# Patient Record
Sex: MALE | Race: WHITE | NOT HISPANIC OR LATINO
[De-identification: names, ages, dates, MRNs, and addresses within clinical notes are randomized per-mention and may not be internally consistent; named-entity substitution may affect disease eponyms.]

---

## 2019-03-15 ENCOUNTER — APPOINTMENT (OUTPATIENT)
Dept: DERMATOLOGY | Facility: CLINIC | Age: 64
End: 2019-03-15
Payer: COMMERCIAL

## 2019-03-15 PROCEDURE — 96910 PHOTCHMTX TAR&UVB/PTRLTM&UVB: CPT

## 2019-03-15 PROCEDURE — 99203 OFFICE O/P NEW LOW 30 MIN: CPT | Mod: 25

## 2019-03-18 ENCOUNTER — APPOINTMENT (OUTPATIENT)
Dept: DERMATOLOGY | Facility: CLINIC | Age: 64
End: 2019-03-18
Payer: COMMERCIAL

## 2019-03-18 PROCEDURE — 96910 PHOTCHMTX TAR&UVB/PTRLTM&UVB: CPT

## 2019-03-20 ENCOUNTER — APPOINTMENT (OUTPATIENT)
Dept: DERMATOLOGY | Facility: CLINIC | Age: 64
End: 2019-03-20
Payer: COMMERCIAL

## 2019-03-20 PROCEDURE — 96910 PHOTCHMTX TAR&UVB/PTRLTM&UVB: CPT

## 2019-03-22 ENCOUNTER — APPOINTMENT (OUTPATIENT)
Dept: DERMATOLOGY | Facility: CLINIC | Age: 64
End: 2019-03-22
Payer: COMMERCIAL

## 2019-03-22 PROCEDURE — 96910 PHOTCHMTX TAR&UVB/PTRLTM&UVB: CPT

## 2019-03-25 ENCOUNTER — APPOINTMENT (OUTPATIENT)
Dept: DERMATOLOGY | Facility: CLINIC | Age: 64
End: 2019-03-25
Payer: COMMERCIAL

## 2019-03-25 PROCEDURE — 96910 PHOTCHMTX TAR&UVB/PTRLTM&UVB: CPT

## 2019-03-27 ENCOUNTER — APPOINTMENT (OUTPATIENT)
Dept: DERMATOLOGY | Facility: CLINIC | Age: 64
End: 2019-03-27
Payer: COMMERCIAL

## 2019-03-27 PROCEDURE — 96910 PHOTCHMTX TAR&UVB/PTRLTM&UVB: CPT

## 2019-04-01 ENCOUNTER — APPOINTMENT (OUTPATIENT)
Dept: DERMATOLOGY | Facility: CLINIC | Age: 64
End: 2019-04-01
Payer: COMMERCIAL

## 2019-04-01 PROCEDURE — 96910 PHOTCHMTX TAR&UVB/PTRLTM&UVB: CPT

## 2019-04-03 ENCOUNTER — APPOINTMENT (OUTPATIENT)
Dept: DERMATOLOGY | Facility: CLINIC | Age: 64
End: 2019-04-03
Payer: COMMERCIAL

## 2019-04-03 PROCEDURE — 96910 PHOTCHMTX TAR&UVB/PTRLTM&UVB: CPT

## 2019-04-05 ENCOUNTER — APPOINTMENT (OUTPATIENT)
Dept: DERMATOLOGY | Facility: CLINIC | Age: 64
End: 2019-04-05
Payer: COMMERCIAL

## 2019-04-05 PROCEDURE — 96910 PHOTCHMTX TAR&UVB/PTRLTM&UVB: CPT

## 2019-04-08 ENCOUNTER — APPOINTMENT (OUTPATIENT)
Dept: DERMATOLOGY | Facility: CLINIC | Age: 64
End: 2019-04-08
Payer: COMMERCIAL

## 2019-04-08 PROCEDURE — 96910 PHOTCHMTX TAR&UVB/PTRLTM&UVB: CPT

## 2019-04-12 ENCOUNTER — APPOINTMENT (OUTPATIENT)
Dept: DERMATOLOGY | Facility: CLINIC | Age: 64
End: 2019-04-12
Payer: COMMERCIAL

## 2019-04-12 PROCEDURE — 96910 PHOTCHMTX TAR&UVB/PTRLTM&UVB: CPT

## 2019-04-15 ENCOUNTER — APPOINTMENT (OUTPATIENT)
Dept: DERMATOLOGY | Facility: CLINIC | Age: 64
End: 2019-04-15
Payer: COMMERCIAL

## 2019-04-15 PROCEDURE — 96910 PHOTCHMTX TAR&UVB/PTRLTM&UVB: CPT

## 2019-04-17 ENCOUNTER — APPOINTMENT (OUTPATIENT)
Dept: DERMATOLOGY | Facility: CLINIC | Age: 64
End: 2019-04-17
Payer: COMMERCIAL

## 2019-04-17 PROCEDURE — 99214 OFFICE O/P EST MOD 30 MIN: CPT

## 2019-04-19 ENCOUNTER — APPOINTMENT (OUTPATIENT)
Dept: DERMATOLOGY | Facility: CLINIC | Age: 64
End: 2019-04-19
Payer: COMMERCIAL

## 2019-04-19 PROCEDURE — 96910 PHOTCHMTX TAR&UVB/PTRLTM&UVB: CPT

## 2019-04-22 ENCOUNTER — APPOINTMENT (OUTPATIENT)
Dept: DERMATOLOGY | Facility: CLINIC | Age: 64
End: 2019-04-22

## 2019-10-25 ENCOUNTER — APPOINTMENT (OUTPATIENT)
Dept: FAMILY MEDICINE | Facility: CLINIC | Age: 64
End: 2019-10-25
Payer: COMMERCIAL

## 2019-10-25 VITALS
WEIGHT: 213 LBS | HEIGHT: 75 IN | BODY MASS INDEX: 26.49 KG/M2 | DIASTOLIC BLOOD PRESSURE: 90 MMHG | SYSTOLIC BLOOD PRESSURE: 146 MMHG | HEART RATE: 78 BPM

## 2019-10-25 DIAGNOSIS — Z00.00 ENCOUNTER FOR GENERAL ADULT MEDICAL EXAMINATION W/OUT ABNORMAL FINDINGS: ICD-10-CM

## 2019-10-25 DIAGNOSIS — L30.9 DERMATITIS, UNSPECIFIED: ICD-10-CM

## 2019-10-25 DIAGNOSIS — Z72.89 OTHER PROBLEMS RELATED TO LIFESTYLE: ICD-10-CM

## 2019-10-25 DIAGNOSIS — N40.0 BENIGN PROSTATIC HYPERPLASIA WITHOUT LOWER URINARY TRACT SYMPMS: ICD-10-CM

## 2019-10-25 PROCEDURE — 99203 OFFICE O/P NEW LOW 30 MIN: CPT

## 2019-10-25 RX ORDER — MULTIVITAMIN
TABLET ORAL
Refills: 0 | Status: ACTIVE | COMMUNITY

## 2019-10-25 RX ORDER — CALCIUM CARBONATE/VITAMIN D3 600 MG-10
TABLET ORAL
Refills: 0 | Status: ACTIVE | COMMUNITY

## 2019-10-27 PROBLEM — N40.0 BPH (BENIGN PROSTATIC HYPERPLASIA): Status: ACTIVE | Noted: 2019-10-27

## 2019-10-27 PROBLEM — L30.9 ECZEMA: Status: ACTIVE | Noted: 2019-10-27

## 2019-10-27 NOTE — REVIEW OF SYSTEMS
[Itching] : itching [Fever] : no fever [Chills] : no chills [Fatigue] : no fatigue [Chest Pain] : no chest pain [Palpitations] : no palpitations [Lower Ext Edema] : no lower extremity edema [Shortness Of Breath] : no shortness of breath [Wheezing] : no wheezing [Cough] : no cough [Joint Pain] : no joint pain [Back Pain] : no back pain [Mole Changes] : no mole changes [Nail Changes] : no nail changes [Headache] : no headache [Dizziness] : no dizziness [Fainting] : no fainting

## 2019-10-27 NOTE — PLAN
[FreeTextEntry1] : FLU VACCINE DECLINED TODAY\par AVOIDANCE OF HOT SHOWERS\par USE OF MOISTURIZER BID\par HAS TRIAMCINOLONE AT HOME\par ADVISED WITH WORSENING MAY NEED LIGHT THERAPY AGAIN WITH DERMATOLOGY\par NAMES OF LOCAL VASCULAR PROVIDED\par USE OF COMPRESSION STOCKINGS\par WILL MONITOR BLOOD PRESSURE - FOLLOW-UP FOR BP CHECK\par HAD EKG, LAB WORK, AUDIOLOGY EVALUATION THROUGH WORK RECENTLY - NEED RESULTS\par COLONOSCOPY REFUSED\par CALL WITH ANY QUESTIONS, CONCERNS OR CHANGES \par \par

## 2019-10-27 NOTE — PHYSICAL EXAM
[No Acute Distress] : no acute distress [Well Nourished] : well nourished [Well-Appearing] : well-appearing [No Respiratory Distress] : no respiratory distress  [No Accessory Muscle Use] : no accessory muscle use [Clear to Auscultation] : lungs were clear to auscultation bilaterally [Normal Rate] : normal rate  [Regular Rhythm] : with a regular rhythm [Normal S1, S2] : normal S1 and S2 [No Joint Swelling] : no joint swelling [Grossly Normal Strength/Tone] : grossly normal strength/tone [No Rash] : no rash [Coordination Grossly Intact] : coordination grossly intact [No Focal Deficits] : no focal deficits [Normal Gait] : normal gait [de-identified] : SIGNIFICANT BILATERAL VARICOSE VEINS MORE ON POSTERIOR THIGH [de-identified] : XEROSIS SKIN ON LEGS

## 2019-10-27 NOTE — HISTORY OF PRESENT ILLNESS
[FreeTextEntry1] : ECZEMA, VARICOSE VEINS [de-identified] : MR. ESCUDERO IS A VERY PLEASANT 63 YO PRESENTING TO [facility].  STATES THAT HE WAS DIAGNOSED WITH ECZEMA THE SAME TIME LAST YEAR.  DID LIGHT THERAPY WITH DERMATOLOGY DR. BAUM.  GIVEN TRIAMCINOLONE.  IMPROVED.  NOW AGAIN, HIS LEGS ARE "A LITTLE ITCHY".  DRY SKIN.  ALSO HISTORY OF VARICOSE VEINS BILATERALLY.  PRIOR REMOVAL ON RIGHT IN 2014.  HAS HAD NO EXTREMITY SWELLING.  LEGS NOT TIRED WITH WALKING BUT ARE UNCOMFORTABLE AT THE END OF THE DAY.  HAS COMPRESSION STOCKINGS AT HOME BUT IS NOT WEARING THEM.  DISCOMFORT MORE ON HIS THIGHS.  IT IS NOT AS UNCOMFORTABLE AS PRIOR TO LAST PROCEDURE.  VASCULAR: DR. CARRILLO.  STATES THAT HIS BLOOD PRESSURE WAS GOOD AT WORK.  NO HISTORY OF HYPERTENSION AND NEVER ON MEDICATION.  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   HAS HAD NO HEADACHE, DIZZINESS, CHEST PAIN, SHORTNESS OF BREATH, GI OR URINARY COMPLAINTS.  NO OTHER COMPLAINTS TODAY. \par \par UROLOGY: DR. FAROOQ\par OPHTHALMOLOGY: DR. YANG\par ENT: DR. JENSEN\par DERMATOLOGY: DR. BAUM

## 2019-11-18 ENCOUNTER — APPOINTMENT (OUTPATIENT)
Age: 64
End: 2019-11-18
Payer: COMMERCIAL

## 2019-11-18 ENCOUNTER — APPOINTMENT (OUTPATIENT)
Dept: VASCULAR SURGERY | Facility: CLINIC | Age: 64
End: 2019-11-18
Payer: COMMERCIAL

## 2019-11-18 VITALS
BODY MASS INDEX: 26.11 KG/M2 | SYSTOLIC BLOOD PRESSURE: 132 MMHG | OXYGEN SATURATION: 98 % | DIASTOLIC BLOOD PRESSURE: 93 MMHG | HEIGHT: 75 IN | HEART RATE: 64 BPM | TEMPERATURE: 98 F | WEIGHT: 210 LBS

## 2019-11-18 DIAGNOSIS — Z82.49 FAMILY HISTORY OF ISCHEMIC HEART DISEASE AND OTHER DISEASES OF THE CIRCULATORY SYSTEM: ICD-10-CM

## 2019-11-18 DIAGNOSIS — Z86.79 PERSONAL HISTORY OF OTHER DISEASES OF THE CIRCULATORY SYSTEM: ICD-10-CM

## 2019-11-18 DIAGNOSIS — I87.2 VENOUS INSUFFICIENCY (CHRONIC) (PERIPHERAL): ICD-10-CM

## 2019-11-18 PROCEDURE — 99203 OFFICE O/P NEW LOW 30 MIN: CPT

## 2019-11-18 PROCEDURE — 93970 EXTREMITY STUDY: CPT

## 2020-01-13 ENCOUNTER — NON-APPOINTMENT (OUTPATIENT)
Age: 65
End: 2020-01-13

## 2020-01-13 ENCOUNTER — APPOINTMENT (OUTPATIENT)
Dept: FAMILY MEDICINE | Facility: CLINIC | Age: 65
End: 2020-01-13
Payer: COMMERCIAL

## 2020-01-13 VITALS
DIASTOLIC BLOOD PRESSURE: 80 MMHG | HEIGHT: 75 IN | SYSTOLIC BLOOD PRESSURE: 120 MMHG | HEART RATE: 80 BPM | WEIGHT: 213 LBS | BODY MASS INDEX: 26.49 KG/M2

## 2020-01-13 DIAGNOSIS — I83.90 ASYMPTOMATIC VARICOSE VEINS OF UNSPECIFIED LOWER EXTREMITY: ICD-10-CM

## 2020-01-13 DIAGNOSIS — I87.2 VENOUS INSUFFICIENCY (CHRONIC) (PERIPHERAL): ICD-10-CM

## 2020-01-13 PROCEDURE — 99215 OFFICE O/P EST HI 40 MIN: CPT | Mod: 25

## 2020-01-13 PROCEDURE — 36415 COLL VENOUS BLD VENIPUNCTURE: CPT

## 2020-01-13 PROCEDURE — 93000 ELECTROCARDIOGRAM COMPLETE: CPT

## 2020-01-13 NOTE — PHYSICAL EXAM
[No Acute Distress] : no acute distress [Well Nourished] : well nourished [Well Developed] : well developed [Well-Appearing] : well-appearing [Normal Oropharynx] : the oropharynx was normal [No Lymphadenopathy] : no lymphadenopathy [Supple] : supple [No Accessory Muscle Use] : no accessory muscle use [Clear to Auscultation] : lungs were clear to auscultation bilaterally [No Respiratory Distress] : no respiratory distress  [Normal Rate] : normal rate  [Regular Rhythm] : with a regular rhythm [Normal S1, S2] : normal S1 and S2 [No Murmur] : no murmur heard [Soft] : abdomen soft [No Edema] : there was no peripheral edema [Pedal Pulses Present] : the pedal pulses are present [Non Tender] : non-tender [Non-distended] : non-distended [Normal Bowel Sounds] : normal bowel sounds [No Joint Swelling] : no joint swelling [Grossly Normal Strength/Tone] : grossly normal strength/tone [Deep Tendon Reflexes (DTR)] : deep tendon reflexes were 2+ and symmetric [Normal Affect] : the affect was normal [Alert and Oriented x3] : oriented to person, place, and time [Normal Insight/Judgement] : insight and judgment were intact [Normal Sclera/Conjunctiva] : normal sclera/conjunctiva [EOMI] : extraocular movements intact [PERRL] : pupils equal round and reactive to light [Normal Outer Ear/Nose] : the outer ears and nose were normal in appearance [No Rash] : no rash [Coordination Grossly Intact] : coordination grossly intact [No Focal Deficits] : no focal deficits [Normal Gait] : normal gait [de-identified] : CERUMEN BILATERALLY [de-identified] : VARICOSE VEINS MORE SIGNIFICANT POSTERIOR LEGS

## 2020-01-13 NOTE — ADDENDUM
[FreeTextEntry1] : I, Gisele Villasenor, acted solely as a scribe for Dr. Molina on this date [1/13/2020].

## 2020-01-13 NOTE — END OF VISIT
[FreeTextEntry3] : All medical record entries made by the Scribe were at my, Dr. Molina's, direction and personally dictated by me on [1/13/2020]. I have reviewed the chart and agree that the record accurately reflects my personal performance of the history, physical exam, assessment and plan. I have also personally directed, reviewed and agreed with the chart.

## 2020-01-13 NOTE — PLAN
[FreeTextEntry1] : OPTIMIZED MEDICALLY FOR PROPOSED SURGERY PROCEDURE PENDING PRE-OPERATIVE TESTING REVIEW\par NEED VASCULAR CONSULTANT NOTES FROM DR. CARRILLO\par EKG: NSR AT 60 BPM, IRBBB\par MR. ESCUDERO TO OBTAIN PRIOR EKGS FOR COMPARISON\par GOOD EXERCISE CAPACITY\par GI/DVT PROPHYLAXIS PER SURGERY\par AVOIDANCE OF NSAIDS, VITAMINS AND ASPIRIN CONTAINING PRODUCTS PRIOR TO SURGERY\par CALL WITH ANY QUESTIONS, CONCERNS OR CHANGES PRIOR TO PROCEDURE\par SUPPORT PROVIDED\par FOLLOW-UP POST-OPERATIVELY\par VACCINATIONS DECLINED\par

## 2020-01-13 NOTE — HISTORY OF PRESENT ILLNESS
[No Pertinent Cardiac History] : no history of aortic stenosis, atrial fibrillation, coronary artery disease, recent myocardial infarction, or implantable device/pacemaker [No Adverse Anesthesia Reaction] : no adverse anesthesia reaction in self or family member [No Pertinent Pulmonary History] : no history of asthma, COPD, sleep apnea, or smoking [(Patient denies any chest pain, claudication, dyspnea on exertion, orthopnea, palpitations or syncope)] : Patient denies any chest pain, claudication, dyspnea on exertion, orthopnea, palpitations or syncope [Chronic Anticoagulation] : no chronic anticoagulation [Chronic Kidney Disease] : no chronic kidney disease [Diabetes] : no diabetes [FreeTextEntry1] : RADIO FREQUENCY ABLATION OF THE LLE GSV FOLLOWED BY STAB PHLEBECTOMY OF THE RLE [FreeTextEntry2] : 1/24/20, 1/28/20 [FreeTextEntry3] : DR. CARRILLO [FreeTextEntry4] : MR. ESCUDERO IS A PLEASANT 65 YEAR OLD PATIENT WHO PRESENTS FOR PREOPERATIVE CLEARANCE FOR UPCOMING ABOVE VASCULAR PROCEDURES.  COMPLAINS OF DISCOMFORT BEHIND RIGHT KNEE RELATED TO VEINS. IS FEELING WELL TODAY. NO HISTORY OF MI, STROKE OR SEIZURE. DENIES PERSONAL OR FAMILY HISTORY OF BLOOD OR CLOTTING DISORDERS. DENIES EASY BLEEDING OR BRUISING. IS ABLE TO WALK MULTIPLE BLOCKS AND GO UP MULTIPLE FLIGHTS OF STAIRS WITHOUT DIFFICULTY. HAS HAD NO HEADACHE, DIZZINESS, CHEST PAIN, SHORTNESS OF BREATH, GI OR URINARY COMPLAINTS. NO OTHER COMPLAINTS.

## 2020-01-14 LAB
ALBUMIN SERPL ELPH-MCNC: 4.6 G/DL
ALP BLD-CCNC: 72 U/L
ALT SERPL-CCNC: 32 U/L
ANION GAP SERPL CALC-SCNC: 11 MMOL/L
APPEARANCE: CLEAR
APTT BLD: 31.4 SEC
AST SERPL-CCNC: 25 U/L
BASOPHILS # BLD AUTO: 0.08 K/UL
BASOPHILS NFR BLD AUTO: 1.2 %
BILIRUB SERPL-MCNC: 1.2 MG/DL
BILIRUBIN URINE: NEGATIVE
BLOOD URINE: NEGATIVE
BUN SERPL-MCNC: 25 MG/DL
CALCIUM SERPL-MCNC: 9.7 MG/DL
CHLORIDE SERPL-SCNC: 101 MMOL/L
CO2 SERPL-SCNC: 27 MMOL/L
COLOR: YELLOW
CREAT SERPL-MCNC: 0.96 MG/DL
EOSINOPHIL # BLD AUTO: 0.25 K/UL
EOSINOPHIL NFR BLD AUTO: 3.8 %
GLUCOSE QUALITATIVE U: NEGATIVE
GLUCOSE SERPL-MCNC: 100 MG/DL
HCT VFR BLD CALC: 52 %
HGB BLD-MCNC: 16.7 G/DL
IMM GRANULOCYTES NFR BLD AUTO: 0.6 %
INR PPP: 0.91 RATIO
KETONES URINE: NEGATIVE
LEUKOCYTE ESTERASE URINE: NEGATIVE
LYMPHOCYTES # BLD AUTO: 1.25 K/UL
LYMPHOCYTES NFR BLD AUTO: 19 %
MAN DIFF?: NORMAL
MCHC RBC-ENTMCNC: 29.1 PG
MCHC RBC-ENTMCNC: 32.1 GM/DL
MCV RBC AUTO: 90.8 FL
MONOCYTES # BLD AUTO: 0.54 K/UL
MONOCYTES NFR BLD AUTO: 8.2 %
NEUTROPHILS # BLD AUTO: 4.43 K/UL
NEUTROPHILS NFR BLD AUTO: 67.2 %
NITRITE URINE: NEGATIVE
PH URINE: 6
PLATELET # BLD AUTO: 298 K/UL
POTASSIUM SERPL-SCNC: 5.2 MMOL/L
PROT SERPL-MCNC: 7.4 G/DL
PROTEIN URINE: NEGATIVE
PT BLD: 10.3 SEC
RBC # BLD: 5.73 M/UL
RBC # FLD: 12.4 %
SODIUM SERPL-SCNC: 139 MMOL/L
SPECIFIC GRAVITY URINE: 1.03
UROBILINOGEN URINE: NORMAL
WBC # FLD AUTO: 6.59 K/UL

## 2020-01-20 ENCOUNTER — APPOINTMENT (OUTPATIENT)
Dept: CARDIOLOGY | Facility: CLINIC | Age: 65
End: 2020-01-20
Payer: COMMERCIAL

## 2020-01-20 ENCOUNTER — NON-APPOINTMENT (OUTPATIENT)
Age: 65
End: 2020-01-20

## 2020-01-20 VITALS
DIASTOLIC BLOOD PRESSURE: 84 MMHG | HEIGHT: 75 IN | BODY MASS INDEX: 26.73 KG/M2 | RESPIRATION RATE: 14 BRPM | HEART RATE: 59 BPM | SYSTOLIC BLOOD PRESSURE: 130 MMHG | WEIGHT: 215 LBS

## 2020-01-20 DIAGNOSIS — R94.31 ABNORMAL ELECTROCARDIOGRAM [ECG] [EKG]: ICD-10-CM

## 2020-01-20 DIAGNOSIS — R03.0 ELEVATED BLOOD-PRESSURE READING, W/OUT DIAGNOSIS OF HYPERTENSION: ICD-10-CM

## 2020-01-20 DIAGNOSIS — Z01.810 ENCOUNTER FOR PREPROCEDURAL CARDIOVASCULAR EXAMINATION: ICD-10-CM

## 2020-01-20 PROCEDURE — 93000 ELECTROCARDIOGRAM COMPLETE: CPT

## 2020-01-20 PROCEDURE — 99244 OFF/OP CNSLTJ NEW/EST MOD 40: CPT

## 2020-01-20 RX ORDER — TRIAMCINOLONE ACETONIDE 1 MG/G
0.1 OINTMENT TOPICAL
Qty: 454 | Refills: 0 | Status: DISCONTINUED | COMMUNITY
Start: 2019-09-11 | End: 2020-01-20

## 2020-01-20 NOTE — PHYSICAL EXAM
[General Appearance - Well Nourished] : well nourished [General Appearance - Well Developed] : well developed [Normal Conjunctiva] : the conjunctiva exhibited no abnormalities [Normal Oropharynx] : normal oropharynx [Normal Jugular Venous V Waves Present] : normal jugular venous V waves present [Heart Rate And Rhythm] : heart rate and rhythm were normal [Heart Sounds] : normal S1 and S2 [Murmurs] : no murmurs present [] : no respiratory distress [Respiration, Rhythm And Depth] : normal respiratory rhythm and effort [Auscultation Breath Sounds / Voice Sounds] : lungs were clear to auscultation bilaterally [Bowel Sounds] : normal bowel sounds [Abdomen Soft] : soft [Abdomen Tenderness] : non-tender [Nail Clubbing] : no clubbing of the fingernails [Abnormal Walk] : normal gait [Cyanosis, Localized] : no localized cyanosis [Skin Color & Pigmentation] : normal skin color and pigmentation [Skin Turgor] : normal skin turgor [Oriented To Time, Place, And Person] : oriented to person, place, and time [Affect] : the affect was normal [FreeTextEntry1] : varicosities bilaterally

## 2020-01-20 NOTE — HISTORY OF PRESENT ILLNESS
[FreeTextEntry1] : Patient is a 66yo M here for cardiac evaluation prior to undergoing RFA of LLE GSV and phlebectomy. Works as  for [employer].  with college aged daughter. No regular exercise, particularly in the winter. Does a lot os sailing and racing in spring and summer. Patient has been doing well without any chest pain or shortness of breath. Patient denies PND/orthopnea/edema/palpitations/syncope/claudication \par \par ROS: GI negative, all others negative

## 2020-01-20 NOTE — DISCUSSION/SUMMARY
[FreeTextEntry1] : Patient is a 64yo M here for cardiac evaluation prior to undergoing RFA of LLE GSV and phlebectomy. Currently no active cardiac conditions, able to achieve more than 4 METS activity. Low risk for low risk procedure. Does have mildly elevated BP in the office, also elevated lipids. Due to risk factors and mildly abnormal ECG I do recommend echo and EST after his procedure for further evaluation and risk stratification. Will discuss statin further at follow up, patient reluctant to take at this time. \par \par 1. Patient is at low cardiovascular risk for low risk surgery \par 2. Due to ECG, elevated BP and HLD, will arrange EST/echo after his procedure and recheck lipdis. Will discuss statin therapy further at follow up as well. \par 3. Follow up after testing

## 2020-02-27 ENCOUNTER — APPOINTMENT (OUTPATIENT)
Dept: FAMILY MEDICINE | Facility: CLINIC | Age: 65
End: 2020-02-27

## 2020-03-09 ENCOUNTER — APPOINTMENT (OUTPATIENT)
Dept: CARDIOLOGY | Facility: CLINIC | Age: 65
End: 2020-03-09
Payer: COMMERCIAL

## 2020-03-09 PROCEDURE — 93306 TTE W/DOPPLER COMPLETE: CPT

## 2020-03-09 PROCEDURE — 93015 CV STRESS TEST SUPVJ I&R: CPT

## 2020-03-11 DIAGNOSIS — I49.3 VENTRICULAR PREMATURE DEPOLARIZATION: ICD-10-CM

## 2020-03-11 DIAGNOSIS — R94.31 ABNORMAL ELECTROCARDIOGRAM [ECG] [EKG]: ICD-10-CM

## 2020-03-11 DIAGNOSIS — I47.2 VENTRICULAR TACHYCARDIA: ICD-10-CM

## 2020-03-18 ENCOUNTER — APPOINTMENT (OUTPATIENT)
Dept: CARDIOLOGY | Facility: CLINIC | Age: 65
End: 2020-03-18

## 2020-04-14 ENCOUNTER — APPOINTMENT (OUTPATIENT)
Dept: CARDIOLOGY | Facility: CLINIC | Age: 65
End: 2020-04-14

## 2020-04-16 ENCOUNTER — APPOINTMENT (OUTPATIENT)
Dept: CARDIOLOGY | Facility: CLINIC | Age: 65
End: 2020-04-16

## 2022-06-20 ENCOUNTER — APPOINTMENT (OUTPATIENT)
Dept: ORTHOPEDIC SURGERY | Facility: CLINIC | Age: 67
End: 2022-06-20
Payer: COMMERCIAL

## 2022-06-20 VITALS — BODY MASS INDEX: 24.37 KG/M2 | HEIGHT: 75 IN | WEIGHT: 196 LBS

## 2022-06-20 PROCEDURE — 72100 X-RAY EXAM L-S SPINE 2/3 VWS: CPT

## 2022-06-20 PROCEDURE — 99204 OFFICE O/P NEW MOD 45 MIN: CPT

## 2022-06-21 NOTE — REVIEW OF SYSTEMS
[Joint Pain] : joint pain [Joint Stiffness] : joint stiffness [FreeTextEntry9] : l-spine, b/l groin, b/l legs/calf

## 2022-06-21 NOTE — PHYSICAL EXAM
[NL (90)] : forward flexion 90 degrees [NL (30)] : right lateral rotation 30 degrees [NL (45)] : extension 45 degrees [NL (40)] : right lateral bending 40 degrees [5___] : right extensor hallicus longus 5[unfilled]/5 [Outside films reviewed] : Outside films reviewed [de-identified] : Constitutional:\par -  General Appearance: \par Unremarkable\par Body Habitus\par Well Developed \par Well Nourished\par Body Habitus\par No Deformities\par Well Groomed\par \par Ability To communicate:\par Normal\par \par Neurologic: \par Global sensation is intact to upper and lower extremities.  See examination of Neck and/or Spine for exceptions.\par Orientation to Time, Place and Person is: Normal\par Mood And Affect is Normal\par \par Skin:\par -  Head/Face, Right Upper/Lower Extremity, Left Upper/Lower Extremity: Normal\par See Examination of Neck and/or Spine for exceptions\par \par Cardiovascular:\par Peripheral Cardiovascular System is Normal\par \par Palpation of Lymph Nodes:\par Normal Palpation of lymph nodes in: Axilla, Cervical, Inguinal\par Abnormal Palpation of lymph nodes in: None  [] : non-antalgic [FreeTextEntry1] : Lumbar DDD\par Disc narrowing throughout the entire lumbar spine\par No listhesis no fracture

## 2022-06-21 NOTE — HISTORY OF PRESENT ILLNESS
[Lower back] : lower back [Sudden] : sudden [6] : 6 [10] : 10 [Radiating] : radiating [Tightness] : tightness [Tingling] : tingling [Constant] : constant [Heat] : heat [Lying in bed] : lying in bed [Full time] : Work status: full time [de-identified] : 68 y/o patient presents for chronic back pain. Patient states onset of pain started in the groin area. Pain radiates down the BLE. Gait is effected due to his groin pain. Patient reports onset of leg pain during nighttime. Patient reports taking ____ to improve leg cramps symptoms. Patient has no leg pain when walking and completing daily tasks. Home based exercises and stretches improve pain.General health is good. 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

## 2022-06-21 NOTE — ASSESSMENT
[FreeTextEntry1] : 66 y/o patient with spinal stenosis. I am requesting a lumbar spine MRI to evaluate for stenosis. I discussed with the patient that if his pain does not improve we can consider referring him to pain management to discuss trying lumbar spine RANJIT's for pain relief. I am prescribing the patient MDP for pain relief. Titration schedule provided.  I would like to follow up with the patient 1-2 weeks to evaluate and review the results of his MRI. \par \par Prior to appointment and during encounter with patient extensive medical records were reviewed including but not limited to, hospital records, out patient records, imaging results, and lab data. During this appointment the patient was examined, diagnoses were discussed and explained in a face to face manner. In addition extensive time was spent reviewing aforementioned diagnostic studies. Counseling including abnormal image results, differential diagnoses, treatment options, risk and benefits, lifestyle changes, current condition, and current medications was performed. Patient's comments, questions, and concerns were address and patient verbalized understanding. Based on this patient's presentation at our office, which is an orthopedic spine surgeon's office, this patient inherently / intrinsically has a risk, however minute, of developing issues such as Cauda equina syndrome, bowel and bladder changes, or progression of motor or neurological deficits such as paralysis which may be permanent.\par \par The documentation recorded by the scribe, in my presence, accurately reflects the service that I personally performed and the decisions made by me with my edits as appropriate.

## 2022-06-22 ENCOUNTER — FORM ENCOUNTER (OUTPATIENT)
Age: 67
End: 2022-06-22

## 2022-06-23 ENCOUNTER — APPOINTMENT (OUTPATIENT)
Dept: MRI IMAGING | Facility: CLINIC | Age: 67
End: 2022-06-23
Payer: COMMERCIAL

## 2022-06-23 PROCEDURE — 72148 MRI LUMBAR SPINE W/O DYE: CPT

## 2022-07-01 ENCOUNTER — APPOINTMENT (OUTPATIENT)
Dept: ORTHOPEDIC SURGERY | Facility: CLINIC | Age: 67
End: 2022-07-01

## 2022-07-01 VITALS — HEIGHT: 75 IN | WEIGHT: 196 LBS | BODY MASS INDEX: 24.37 KG/M2

## 2022-07-01 PROCEDURE — 99214 OFFICE O/P EST MOD 30 MIN: CPT

## 2022-07-03 NOTE — ASSESSMENT
[FreeTextEntry1] : 66 y/o patient with spinal stenosis. Referred patient to pain management to discuss trying lumbar spine RANJIT's for pain relief. Patient was provided with a lumbar home exercise program. Discussed with the patient he is not a surgical candidate at this time. Advised patient to continue with Meloxicam treatmen as prescribedt. Medication risks and benefits reviewed. Discussed referral to rheumatology.  Provided patient with referral to vascular lab  to receive ankle brachial index.I would like to follow up with the patient in 3 weeks. \par \par Prior to appointment and during encounter with patient extensive medical records were reviewed including but not limited to, hospital records, out patient records, imaging results, and lab data. During this appointment the patient was examined, diagnoses were discussed and explained in a face to face manner. In addition extensive time was spent reviewing aforementioned diagnostic studies. Counseling including abnormal image results, differential diagnoses, treatment options, risk and benefits, lifestyle changes, current condition, and current medications was performed. Patient's comments, questions, and concerns were address and patient verbalized understanding. Based on this patient's presentation at our office, which is an orthopedic spine surgeon's office, this patient inherently / intrinsically has a risk, however minute, of developing issues such as Cauda equina syndrome, bowel and bladder changes, or progression of motor or neurological deficits such as paralysis which may be permanent.\par \par The documentation recorded by the scribe, in my presence, accurately reflects the service that I personally performed and the decisions made by me with my edits as appropriate.

## 2022-07-03 NOTE — PHYSICAL EXAM
[NL (90)] : forward flexion 90 degrees [NL (30)] : right lateral rotation 30 degrees [NL (45)] : extension 45 degrees [NL (40)] : right lateral bending 40 degrees [5___] : right extensor hallicus longus 5[unfilled]/5 [Outside films reviewed] : Outside films reviewed [de-identified] : Constitutional:\par -  General Appearance: \par Unremarkable\par Body Habitus\par Well Developed \par Well Nourished\par Body Habitus\par No Deformities\par Well Groomed\par \par Ability To communicate:\par Normal\par \par Neurologic: \par Global sensation is intact to upper and lower extremities.  See examination of Neck and/or Spine for exceptions.\par Orientation to Time, Place and Person is: Normal\par Mood And Affect is Normal\par \par Skin:\par -  Head/Face, Right Upper/Lower Extremity, Left Upper/Lower Extremity: Normal\par See Examination of Neck and/or Spine for exceptions\par \par Cardiovascular:\par Peripheral Cardiovascular System is Normal\par \par Palpation of Lymph Nodes:\par Normal Palpation of lymph nodes in: Axilla, Cervical, Inguinal\par Abnormal Palpation of lymph nodes in: None  [] : clonus not sustained at ankle [FreeTextEntry1] : Lumbar DDD\par Disc narrowing throughout the entire lumbar spine\par No listhesis no fracture

## 2022-07-03 NOTE — HISTORY OF PRESENT ILLNESS
[Lower back] : lower back [Sudden] : sudden [6] : 6 [10] : 10 [Radiating] : radiating [Tightness] : tightness [Tingling] : tingling [Constant] : constant [Heat] : heat [Lying in bed] : lying in bed [Full time] : Work status: full time [de-identified] : 68 y/o patient presents for chronic back pain. Patient states onset of pain started in the groin area. Pain radiates into the b/l thigh reggion. Pain in RT thigh > LT thigh. Gait is effected due to his groin pain. Patient reports onset of leg pain during nighttime. Patient reports taking Meloxicam to improve leg cramps symptoms which he has found helpful. Symptoms improve when walking and completing daily tasks. Home based exercises and stretches improve pain. General health is good. 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

## 2022-07-03 NOTE — REVIEW OF SYSTEMS
[Arthralgia] : arthralgia [Joint Pain] : joint pain [Joint Stiffness] : joint stiffness [Negative] : Heme/Lymph [FreeTextEntry9] : low back

## 2022-07-21 ENCOUNTER — APPOINTMENT (OUTPATIENT)
Dept: PAIN MANAGEMENT | Facility: CLINIC | Age: 67
End: 2022-07-21

## 2022-07-21 ENCOUNTER — APPOINTMENT (OUTPATIENT)
Dept: FAMILY MEDICINE | Facility: CLINIC | Age: 67
End: 2022-07-21

## 2022-07-21 VITALS
DIASTOLIC BLOOD PRESSURE: 84 MMHG | WEIGHT: 190 LBS | HEIGHT: 75 IN | HEART RATE: 74 BPM | SYSTOLIC BLOOD PRESSURE: 136 MMHG | OXYGEN SATURATION: 98 % | BODY MASS INDEX: 23.62 KG/M2

## 2022-07-21 VITALS — BODY MASS INDEX: 24.37 KG/M2 | HEIGHT: 75 IN | WEIGHT: 196 LBS

## 2022-07-21 DIAGNOSIS — Z01.818 ENCOUNTER FOR OTHER PREPROCEDURAL EXAMINATION: ICD-10-CM

## 2022-07-21 DIAGNOSIS — E78.5 HYPERLIPIDEMIA, UNSPECIFIED: ICD-10-CM

## 2022-07-21 DIAGNOSIS — M79.18 MYALGIA, OTHER SITE: ICD-10-CM

## 2022-07-21 DIAGNOSIS — M48.062 SPINAL STENOSIS, LUMBAR REGION WITH NEUROGENIC CLAUDICATION: ICD-10-CM

## 2022-07-21 PROCEDURE — 99203 OFFICE O/P NEW LOW 30 MIN: CPT

## 2022-07-21 PROCEDURE — 99214 OFFICE O/P EST MOD 30 MIN: CPT

## 2022-07-21 RX ORDER — METHYLPREDNISOLONE 4 MG/1
4 TABLET ORAL
Qty: 1 | Refills: 0 | Status: DISCONTINUED | COMMUNITY
Start: 2022-06-20 | End: 2022-07-21

## 2022-07-21 NOTE — PHYSICAL EXAM
[de-identified] : Constitutional: \par - No acute distress \par - Well developed; well nourished \par \par Neurological: \par - normal mood and affect \par - alert and oriented x 3  \par \par Cardiovascular: \par - grossly normal\par - varicose veins \par \par Lumbar Spine Exam: \par \par Inspection: \par erythema (-) \par ecchymosis (-) \par rashes (-) \par alignment: no scoliosis\par \par Palpation:  \par paraspinal tenderness:                  L (-) ; R (-) \par thoracic paraspinal tenderness:    L (-) ; R (-) \par sciatic nerve tenderness :             L (-) ; R (-) \par SI joint tenderness:                        L (-) ; R (-) \par GTB tenderness:                            L (-);  R (-) \par \par ROM:   \par Full ROM with mild stiffness \par \par Strength:                   Right       Left    \par Hip Flexion:                (5/5)       (5/5) \par Quadriceps:               (5/5)       (5/5) \par Hamstrings:                (5/5)       (5/5) \par Ankle Dorsiflexion:     (5/5)       (5/5) \par EHL:                            (5/5)       (5/5) \par Ankle Plantarflexion:  (5/5)       (5/5) \par \par \par Special Tests: \par SLR:                      R (-) ; L (-) \par Facet loading:       R (-) ; L (-) \par NITESH test:          R (-) ; L (-) \par XSLR:                   R (-) ; L (-) \par Ag's test:        R (-) ; L(-) \par Tight Hamstrings   R (+) ; L (+) \par \par Neurologic: \par Light touch intact throughout LE \par Reflexes normal and symmetric \par \par Gait: \par non- antalgic gait

## 2022-07-21 NOTE — HISTORY OF PRESENT ILLNESS
[Lower back] : lower back [Left Leg] : left leg [Sudden] : sudden [6] : 6 [5] : 5 [Dull/Aching] : dull/aching [Localized] : localized [Constant] : constant [Leisure] : leisure [Meds] : meds [Walking/activity] : walking/activity [Walking] : walking [Bending forward] : bending forward [FreeTextEntry1] : bilateral shoulder [] : no [FreeTextEntry7] : bilateral legs, bilateral shoulders  [FreeTextEntry8] : w [de-identified] : lumbar mri at [facility] [place]

## 2022-07-21 NOTE — ASSESSMENT
[FreeTextEntry1] : A discussion regarding available pain management treatment options occurred with the patient.  These included interventional, rehabilitative, pharmacological, and alternative modalities. We will proceed with the following: \par \par Interventional treatment options: \par - None indicated at present time \par - do not believe lumbar interventional therapy will ameliorate his diffuse symptoms he is experiencing  \par - see additional instructions below \par \par Rehabilitative options: \par - participation in active HEP was discussed as tolerated\par \par Medication based treatment options: \par - initiate trial of MDP \par - resume meloxicam upon completion of MDP \par - see additional instructions below \par \par Complementary treatment options: \par - lifestyle modifications discussed \par - See additional instructions below \par \par Additional treatment recommendations as follows: \par - patient needs medical workup \par - clinical concern for PMR \par - F/U PRN\par \par We have discussed the risks, benefits, and alternatives NSAID therapy including but not limited to the risk of bleeding, thrombosis, gastric mucosal irritation/ulceration, allergic reaction and kidney dysfunction; the patient verbalizes an understanding.\par \par The documentation recorded by the scribe, in my presence, accurately reflects the service I personally performed and the decisions made by me with my edits as appropriate.\par \par I, Rocio Moore acting as scribe, attest that this documentation has been prepared under the direction and in the presence of Provider Karsten King DO.

## 2022-07-22 ENCOUNTER — APPOINTMENT (OUTPATIENT)
Dept: NEUROLOGY | Facility: CLINIC | Age: 67
End: 2022-07-22

## 2022-07-22 ENCOUNTER — LABORATORY RESULT (OUTPATIENT)
Age: 67
End: 2022-07-22

## 2022-07-22 VITALS
BODY MASS INDEX: 23.62 KG/M2 | WEIGHT: 190 LBS | HEIGHT: 75 IN | DIASTOLIC BLOOD PRESSURE: 90 MMHG | SYSTOLIC BLOOD PRESSURE: 138 MMHG

## 2022-07-22 DIAGNOSIS — R25.2 CRAMP AND SPASM: ICD-10-CM

## 2022-07-22 PROCEDURE — 99204 OFFICE O/P NEW MOD 45 MIN: CPT

## 2022-07-22 RX ORDER — MELOXICAM 15 MG/1
15 TABLET ORAL
Qty: 30 | Refills: 0 | Status: COMPLETED | COMMUNITY
Start: 2022-06-20 | End: 2022-07-22

## 2022-07-22 NOTE — DATA REVIEWED
[de-identified] : MRI of the lumbar spine was performed on 6/23/2022 at [facility].\par The study demonstrated a broad-based disc herniation at L4/5.  \par There were mild disc bulges at other levels.

## 2022-07-22 NOTE — ASSESSMENT
[FreeTextEntry1] : This is a 67-year-old man with a few month history of muscle cramping.\par Neurologic examination is entirely normal.\par Although he has some lumbar spine pathology, this is not the etiology of the muscle cramps.\par \par He was given a referral for blood tests which included a CK level and magnesium level as well as thyroid studies and sedimentation rate.\par \par I would like to obtain EMG and nerve conduction studies to assess for any myopathic features.\par I have explained that he must obtain the blood tests prior to having the electrical testing done.\par He reports that he was drawn the morning of this appointment date.\par \par I will see him back afterward.

## 2022-07-22 NOTE — PHYSICAL EXAM
[General Appearance - Alert] : alert [General Appearance - In No Acute Distress] : in no acute distress [Oriented To Time, Place, And Person] : oriented to person, place, and time [Affect] : the affect was normal [Memory Recent] : recent memory was not impaired [Memory Remote] : remote memory was not impaired [Cranial Nerves Optic (II)] : visual acuity intact bilaterally,  visual fields full to confrontation, pupils equal round and reactive to light [Cranial Nerves Oculomotor (III)] : extraocular motion intact [Cranial Nerves Trigeminal (V)] : facial sensation intact symmetrically [Cranial Nerves Facial (VII)] : face symmetrical [Cranial Nerves Vestibulocochlear (VIII)] : hearing was intact bilaterally [Cranial Nerves Glossopharyngeal (IX)] : tongue and palate midline [Cranial Nerves Accessory (XI - Cranial And Spinal)] : head turning and shoulder shrug symmetric [Cranial Nerves Hypoglossal (XII)] : there was no tongue deviation with protrusion [Motor Tone] : muscle tone was normal in all four extremities [Motor Strength] : muscle strength was normal in all four extremities [Sensation Pain / Temperature Decrease] : pain and temperature was intact [Sensation Tactile Decrease] : light touch was intact [Sensation Vibration Decrease] : vibration was intact [Abnormal Walk] : normal gait [2+] : Ankle jerk left 2+ [Optic Disc Abnormality] : the optic disc were normal in size and color [Dysarthria] : no dysarthria [Aphasia] : no dysphasia/aphasia [Coordination - Dysmetria Impaired Finger-to-Nose Bilateral] : not present [Plantar Reflex Right Only] : normal on the right [Plantar Reflex Left Only] : normal on the left

## 2022-07-22 NOTE — HISTORY OF PRESENT ILLNESS
[FreeTextEntry1] : I saw this patient in the office today.\par \par He describes cramps in his legs and shoulders.\par This has been going on since May 2022.\par It is sometimes worse in the legs and sometimes worse in the shoulders.\par It is worse when he lays down to sleep and also is notable when he is driving.\par

## 2022-07-22 NOTE — CONSULT LETTER
[Dear  ___] : Dear  [unfilled], [Courtesy Letter:] : I had the pleasure of seeing your patient, [unfilled], in my office today. [Please see my note below.] : Please see my note below. [Consult Closing:] : Thank you very much for allowing me to participate in the care of this patient.  If you have any questions, please do not hesitate to contact me. [Sincerely,] : Sincerely, [FreeTextEntry3] : Phong Mcmanus MD.

## 2022-08-02 ENCOUNTER — APPOINTMENT (OUTPATIENT)
Dept: FAMILY MEDICINE | Facility: CLINIC | Age: 67
End: 2022-08-02

## 2022-08-02 VITALS
SYSTOLIC BLOOD PRESSURE: 110 MMHG | OXYGEN SATURATION: 99 % | HEART RATE: 74 BPM | DIASTOLIC BLOOD PRESSURE: 70 MMHG | BODY MASS INDEX: 23.38 KG/M2 | WEIGHT: 188 LBS | HEIGHT: 75 IN

## 2022-08-02 DIAGNOSIS — R79.89 OTHER SPECIFIED ABNORMAL FINDINGS OF BLOOD CHEMISTRY: ICD-10-CM

## 2022-08-02 PROBLEM — Z01.818 PREOPERATIVE CLEARANCE: Status: RESOLVED | Noted: 2020-01-13 | Resolved: 2022-08-02

## 2022-08-02 PROBLEM — E78.5 DYSLIPIDEMIA: Status: ACTIVE | Noted: 2020-01-20

## 2022-08-02 LAB
ALBUMIN SERPL ELPH-MCNC: 4.3 G/DL
ALP BLD-CCNC: 69 U/L
ALT SERPL-CCNC: 15 U/L
ANA PAT FLD IF-IMP: ABNORMAL
ANA SER IF-ACNC: ABNORMAL
ANION GAP SERPL CALC-SCNC: 10 MMOL/L
APPEARANCE: CLEAR
AST SERPL-CCNC: 15 U/L
BACTERIA: NEGATIVE
BASOPHILS # BLD AUTO: 0.09 K/UL
BASOPHILS NFR BLD AUTO: 0.8 %
BILIRUB SERPL-MCNC: 0.6 MG/DL
BILIRUBIN URINE: NEGATIVE
BLOOD URINE: NEGATIVE
BUN SERPL-MCNC: 27 MG/DL
CALCIUM OXALATE CRYSTALS: ABNORMAL
CALCIUM SERPL-MCNC: 9.3 MG/DL
CHLORIDE SERPL-SCNC: 102 MMOL/L
CHOLEST SERPL-MCNC: 177 MG/DL
CK SERPL-CCNC: 60 U/L
CO2 SERPL-SCNC: 27 MMOL/L
COLOR: ABNORMAL
CREAT SERPL-MCNC: 0.84 MG/DL
EGFR: 96 ML/MIN/1.73M2
EOSINOPHIL # BLD AUTO: 0.25 K/UL
EOSINOPHIL NFR BLD AUTO: 2.1 %
ERYTHROCYTE [SEDIMENTATION RATE] IN BLOOD BY WESTERGREN METHOD: 45 MM/HR
ESTIMATED AVERAGE GLUCOSE: 120 MG/DL
GLUCOSE QUALITATIVE U: NEGATIVE
GLUCOSE SERPL-MCNC: 108 MG/DL
HBA1C MFR BLD HPLC: 5.8 %
HCT VFR BLD CALC: 46.2 %
HDLC SERPL-MCNC: 50 MG/DL
HGB BLD-MCNC: 14.1 G/DL
HYALINE CASTS: 0 /LPF
IMM GRANULOCYTES NFR BLD AUTO: 0.4 %
KETONES URINE: NEGATIVE
LDLC SERPL CALC-MCNC: 116 MG/DL
LEUKOCYTE ESTERASE URINE: NEGATIVE
LYMPHOCYTES # BLD AUTO: 1.02 K/UL
LYMPHOCYTES NFR BLD AUTO: 8.7 %
MAGNESIUM SERPL-MCNC: 2.2 MG/DL
MAN DIFF?: NORMAL
MCHC RBC-ENTMCNC: 27.5 PG
MCHC RBC-ENTMCNC: 30.5 GM/DL
MCV RBC AUTO: 90.1 FL
MICROSCOPIC-UA: NORMAL
MONOCYTES # BLD AUTO: 0.75 K/UL
MONOCYTES NFR BLD AUTO: 6.4 %
NEUTROPHILS # BLD AUTO: 9.61 K/UL
NEUTROPHILS NFR BLD AUTO: 81.6 %
NITRITE URINE: NEGATIVE
NONHDLC SERPL-MCNC: 126 MG/DL
PH URINE: 6
PLATELET # BLD AUTO: 482 K/UL
POTASSIUM SERPL-SCNC: 5 MMOL/L
PROT SERPL-MCNC: 6.9 G/DL
PROTEIN URINE: NEGATIVE
RBC # BLD: 5.13 M/UL
RBC # FLD: 13.2 %
RED BLOOD CELLS URINE: 3 /HPF
RHEUMATOID FACT SER QL: <10 IU/ML
SODIUM SERPL-SCNC: 139 MMOL/L
SPECIFIC GRAVITY URINE: 1.02
SQUAMOUS EPITHELIAL CELLS: 0 /HPF
T4 FREE SERPL-MCNC: 1.5 NG/DL
TRIGL SERPL-MCNC: 53 MG/DL
TSH SERPL-ACNC: 1.71 UIU/ML
UROBILINOGEN URINE: NORMAL
WBC # FLD AUTO: 11.77 K/UL
WHITE BLOOD CELLS URINE: 0 /HPF

## 2022-08-02 PROCEDURE — 99214 OFFICE O/P EST MOD 30 MIN: CPT

## 2022-08-02 NOTE — REVIEW OF SYSTEMS
[Negative] : Gastrointestinal [Headache] : no headache [Dizziness] : no dizziness [FreeTextEntry9] : SEE HPI

## 2022-08-02 NOTE — PLAN
[FreeTextEntry1] : PAIN  NOTE APPRECIATED\par PRIOR LAB WORK REVIEWED\par NEED VASCULAR NOTE AND TESTING PERFORMED\par WILL SEND FOR LAB WORK-UP INCLUDING JA, ESR, LYME AND RF.  WILL ALSO SEND CPK, MAGNESIUM\par WILL GET UPDATED LIPIDS\par FALL PRECAUTIONS\par ALREADY SAW ORTHOPEDICS, VASCULAR AND PAIN MANAGEMENT\par WILL REFER TO RHEUMATOLOGY ? PMR\par WILL ALSO REFER TO NEUROLOGY FOR EVALUATION\par TO ER WITH ANY WORSENING OR CONCERNS\par COLONOSCOPY\par FOLLOW-UP FOR CPE\par CALL WITH ANY QUESTIONS, CONCERNS OR CHANGES

## 2022-08-02 NOTE — HISTORY OF PRESENT ILLNESS
[FreeTextEntry1] : F/U LAB WORK [de-identified] : MR. ESCUDERO IS A PLEASANT 68 YO PRESENTING FOR FOLLOW-UP OF LEG AND SHOULDER PAIN.  HAS HAD WORK-UP REPORTEDLY WITH PAIN MANAGEMENT, ORTHOPEDICS AS WELL AS VASCULAR WITH MRI IMAGING AND VASCULAR STUDIES.  SAW NEUROLOGY AFTER LAST VISIT AND IS TO HAVE AN EMG.  WILL BE SEEING RHEUMATOLOGY TOMORROW.  STATES THAT LEGS ARE BETTER THE PAST TWO WEEKS.  TROUBLE SLEEPING FROM THE PAIN HE GETS.  NO FALLS OR TRAUMA.  TOOK PREDNISONE PREVIOUSLY PRESCRIBED TO HIM AND IT HELPED A LOT THE FIRST COUPLE OF DAYS AND THEN PAIN RETURNED.  HAD LAB WORK.  NO NEW COMPLAINTS TODAY.  DENIES FEVER, URI SYMPTOMS OR ANY RECENT ILLNESS.

## 2022-08-02 NOTE — PHYSICAL EXAM
[No Acute Distress] : no acute distress [Well Nourished] : well nourished [Well-Appearing] : well-appearing [Normal Sclera/Conjunctiva] : normal sclera/conjunctiva [PERRL] : pupils equal round and reactive to light [No Respiratory Distress] : no respiratory distress  [No Accessory Muscle Use] : no accessory muscle use [Clear to Auscultation] : lungs were clear to auscultation bilaterally [Normal Rate] : normal rate  [Regular Rhythm] : with a regular rhythm [Normal S1, S2] : normal S1 and S2 [No Joint Swelling] : no joint swelling [Grossly Normal Strength/Tone] : grossly normal strength/tone [Coordination Grossly Intact] : coordination grossly intact [No Focal Deficits] : no focal deficits [Deep Tendon Reflexes (DTR)] : deep tendon reflexes were 2+ and symmetric

## 2022-08-02 NOTE — PLAN
[FreeTextEntry1] : NEUROLOGY CONSULTANT NOTE APPRECIATED ALONG WITH PAIN MANAGEMENT AND ORTHOPEDICS\par STILL NEED VASCULAR CONSULTANT NOTE AND TESTING REPORTEDLY RECENTLY PERFORMED\par RECENT LAB WORK REVIEWED\par WILL BE SEEING RHEUMATOLOGY TOMORROW FOR EVALUATION OF SYMPTOMS, +JA/ESR ? PMR\par WILL REPEAT CBC TO START\par TO ER WITH ANY WORSENING OR CONCERNS\par SUPPORT GIVEN\par CALL WITH ANY QUESTIONS, CONCERNS OR CHANGES \par FOLLOW-UP FOR CPE\par COLONOSCOPY

## 2022-08-02 NOTE — HISTORY OF PRESENT ILLNESS
[FreeTextEntry8] : MR. ESCUDERO IS A PLEASANT 68 YO PRESENTING FOR ACUTE VISIT.  WAS LAST SEEN JANUARY 2020.  HAS BEEN GETTING MUSCLE PAIN IN LEGS AND SHOULDERS.  CHANGED SHOES AND WAS A LITTLE BETTER.  IS STILL GETTING LEG CRAMPS AND MUSCLE ACHE ESPECIALLY AT NIGHT.  WHEN STARTS MOVING IN THE MORNING STARTS TO FEEL BETTER.  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  REFERRED TO ORTHO SPINAL SURGEON.  HAD XRAY IMAGING AND MRI - ADVISED HERNIATED DISCS OF THE LUMBAR REGION.  WAS SENT FOR PAD TESTING AND SONOGRAM OF ARTERIES.  REFERRED TO PAIN MANAGEMENT.  GIVEN STEROIDS TODAY.\par SYMPTOMS STARTED ABOUT 2 1/2 MONTHS AGO.  HAS HAD NO FALLS OR TRAUMA.  NO KNOWN TICK BITES.  NO BACK PAIN.  SAW CARDIOLOGY DR. JESUS GIRON.  NOT ON MEDICATION.  HAD PSA LAST WEEK WITH UROLOGY DR. FAROOQ.  IS OTHERWISE FEELING WELL.

## 2022-08-03 ENCOUNTER — RESULT REVIEW (OUTPATIENT)
Age: 67
End: 2022-08-03

## 2022-08-03 ENCOUNTER — OUTPATIENT (OUTPATIENT)
Dept: OUTPATIENT SERVICES | Facility: HOSPITAL | Age: 67
LOS: 1 days | Discharge: HOME | End: 2022-08-03

## 2022-08-03 ENCOUNTER — APPOINTMENT (OUTPATIENT)
Dept: RHEUMATOLOGY | Facility: CLINIC | Age: 67
End: 2022-08-03

## 2022-08-03 VITALS
HEIGHT: 75 IN | DIASTOLIC BLOOD PRESSURE: 80 MMHG | SYSTOLIC BLOOD PRESSURE: 150 MMHG | HEART RATE: 63 BPM | TEMPERATURE: 98 F | BODY MASS INDEX: 23.13 KG/M2 | WEIGHT: 186 LBS | OXYGEN SATURATION: 100 %

## 2022-08-03 DIAGNOSIS — M25.559 PAIN IN UNSPECIFIED HIP: ICD-10-CM

## 2022-08-03 DIAGNOSIS — R68.2 DRY MOUTH, UNSPECIFIED: ICD-10-CM

## 2022-08-03 DIAGNOSIS — M25.50 PAIN IN UNSPECIFIED JOINT: ICD-10-CM

## 2022-08-03 DIAGNOSIS — D72.829 ELEVATED WHITE BLOOD CELL COUNT, UNSPECIFIED: ICD-10-CM

## 2022-08-03 DIAGNOSIS — R70.0 ELEVATED ERYTHROCYTE SEDIMENTATION RATE: ICD-10-CM

## 2022-08-03 DIAGNOSIS — M25.569 PAIN IN UNSPECIFIED KNEE: ICD-10-CM

## 2022-08-03 PROCEDURE — 99205 OFFICE O/P NEW HI 60 MIN: CPT

## 2022-08-03 RX ORDER — PHENOL 1.4 %
600-400 AEROSOL, SPRAY (ML) MUCOUS MEMBRANE
Qty: 60 | Refills: 1 | Status: ACTIVE | COMMUNITY
Start: 2022-08-03 | End: 1900-01-01

## 2022-08-03 RX ORDER — METHYLPREDNISOLONE 4 MG/1
4 TABLET ORAL
Qty: 1 | Refills: 0 | Status: DISCONTINUED | COMMUNITY
Start: 2022-07-21 | End: 2022-08-03

## 2022-08-03 NOTE — HISTORY OF PRESENT ILLNESS
[FreeTextEntry1] : 67-year-old male here for the first time states he started feeling unwell about 3 months ago. States he had about a 10 pound unintentional weight loss since then. Patient states he noted pain in his groin/ pelvic area with myalgias in that area. He states he saw Vascular w/ work up ok there. He states then the pain progressed to his bilateral shoulders where he would notice soreness and stiffness proximally to the extent that it made him cry.\par Patient states he went to [facility] where he was evaluated for back pain and told he had herniated discs on MRI.\par States he then was sent to pain management where he was told he does not need an injection and likely has polymyalgia rheumatica.\par Patient states he was given a Medrol Dose pack that gave him prompt response to his pain and symptoms in the bilateral shoulders and pelvic area.\par States he recently finished the Medrol Dose pack and notices the soreness and stiffness returning in his bilateral shoulders again now.\par Good ROM in b/l shoulders w/o soreness into upper arm today, no pelvic/girdle stiffness and able to stand up without using his hands.\par  No temporal pain/unremitting headaches, no vision changes, no jaw pain at any time.\par States he can notice intermittent knee pain with kneeling and denies any crystal arthropathy like attacks.\par Denies any swelling or redness or warmth of any joints.\par Noted on labs w/ PCP to have high ESR and +JA.\par Denies any fever/chills, no rashes, no ulcers, no dry eyes, + dry mouth & perhaps sleeps w/ mouth open, no raynaud's, no infectious diarrhea or  symptoms at this time.\par

## 2022-08-03 NOTE — REASON FOR VISIT
[Consultation] : a consultation visit [FreeTextEntry1] : bilateral shoulder pain; had hip/pelvic pain; high ESR; +JA

## 2022-08-03 NOTE — PHYSICAL EXAM
[General Appearance - Alert] : alert [General Appearance - In No Acute Distress] : in no acute distress [Sclera] : the sclera and conjunctiva were normal [Extraocular Movements] : extraocular movements were intact [Outer Ear] : the ears and nose were normal in appearance [Neck Appearance] : the appearance of the neck was normal [Respiration, Rhythm And Depth] : normal respiratory rhythm and effort [Heart Rate And Rhythm] : heart rate was normal and rhythm regular [Heart Sounds] : normal S1 and S2 [Abdomen Soft] : soft [Abdomen Tenderness] : non-tender [Cervical Lymph Nodes Enlarged Anterior Bilaterally] : anterior cervical [Supraclavicular Lymph Nodes Enlarged Bilaterally] : supraclavicular [No CVA Tenderness] : no ~M costovertebral angle tenderness [Motor Tone] : muscle strength and tone were normal [] : no rash [No Focal Deficits] : no focal deficits [Impaired Insight] : insight and judgment were intact [Mood] : the mood was normal [FreeTextEntry1] : tenderness in b/l shoulder into upper arm w/ abduction w/ good ROM; able to stand up w/o using his hand; heberden nodes at DIPs; No synovitis or effusion on exam noted today

## 2022-08-03 NOTE — ASSESSMENT
[FreeTextEntry1] : 67-year-old male, here for the first time w/ OA hands; reports for the past about 3 months he had pelvic/ hip pain that then progressed to bilateral shoulder pain/stiffness that made him cry, w/ high ESR=45 that responded promptly to medrol dose pack concerning for Polymyalgia Rheumatica w/ handout given as discussed in detail today.  \par States he notes 10 lb unintentional wt loss to see Heme/onc.\par -reviewed labs 7/22/22 w/ high ESR=45; CPK normal=60; normal TSH; normal T4; +JA 1:320 homogenous; RF negative; high WBC=11.77 & high glbq=402 (can be reactive to f/u w/ heme/onc also); CMP ok; UA micro w/ calcium oxylate crystals (handout given on foods to avoid)\par -today after medrol dose pack he has Good ROM in b/l shoulders, no pelvic/girdle stiffness and able to stand up without using her hands, no temporal pain/unremitting headaches, no vision changes, no jaw pain\par -states he finished the medrol dose pack recently and notes soreness/stiffness in bilateral shoulder returning now -discussed r/b/s of Prednisone 20 mg daily x 1 week; then prednisone 17.5 mg daily x 1 week then prednisone 15 mg daily x 1 week; then prednisone 12.5 mg daily until follow up w/ pt agreeable and prescription sent as below\par -discussed to take Ca+vitD BID while on steroids\par -discussed will consider MTX as steroid sparing agent if needed \par -denies any GCA symptoms and knows to alert us if any concerns discussed  \par -No synovitis or effusion on exam noted today and advised to monitor.\par -labs as below incld ESR, CRP, serologies to be complete\par -Referred for xray of b/l shoulder to evaluate for structural changes, eval for calcific tendonitis, high riding humerus/rotator cuff pathology\par -Referred for xray of b/l hips/ pelvis to evaluate for structural changes, OA\par \par +JA 1:320 homogenous:\par -Clinically without much symptoms of lupus at this time and educated on symptoms to monitor for in detail if he evolves.\par -lab as below w/ disease activity markers as below to be complete\par -will check thyroid abs as discussed +JA can be seen with cross reactivity\par \par Dry mouth: perhaps sleeps w/ mouth open; discussed biotene mouthwash or toothpaste PRN; check Sjogren abs \par \par Intermittent Knee pain -Referred for xray of b/l knees to evaluate for structural changes, OA\par -consider steroid injections if pain \par \par -educated on symptoms to monitor for in detail and alert us if any concerns.\par -knows to stay up to date on health maintenance w/ PCP\par -f/u in 3-4 weeks w/ labs, xrays please\par

## 2022-08-04 ENCOUNTER — APPOINTMENT (OUTPATIENT)
Dept: HEMATOLOGY ONCOLOGY | Facility: CLINIC | Age: 67
End: 2022-08-04

## 2022-08-04 VITALS
SYSTOLIC BLOOD PRESSURE: 132 MMHG | OXYGEN SATURATION: 100 % | BODY MASS INDEX: 23.13 KG/M2 | RESPIRATION RATE: 73 BRPM | DIASTOLIC BLOOD PRESSURE: 87 MMHG | WEIGHT: 186 LBS | HEIGHT: 75 IN

## 2022-08-04 DIAGNOSIS — R63.4 ABNORMAL WEIGHT LOSS: ICD-10-CM

## 2022-08-04 DIAGNOSIS — M79.10 MYALGIA, UNSPECIFIED SITE: ICD-10-CM

## 2022-08-04 DIAGNOSIS — R79.89 OTHER SPECIFIED ABNORMAL FINDINGS OF BLOOD CHEMISTRY: ICD-10-CM

## 2022-08-04 LAB
BASOPHILS # BLD AUTO: 0.08 K/UL
BASOPHILS NFR BLD AUTO: 0.8 %
C3 SERPL-MCNC: 143 MG/DL
C4 SERPL-MCNC: 27 MG/DL
CCP AB SER IA-ACNC: <8 UNITS
CREAT SPEC-SCNC: 20 MG/DL
CREAT/PROT UR: NORMAL RATIO
CRP SERPL-MCNC: 20 MG/L
DSDNA AB SER-ACNC: <12 IU/ML
EOSINOPHIL # BLD AUTO: 0.23 K/UL
EOSINOPHIL NFR BLD AUTO: 2.3 %
HAV IGM SER QL: NONREACTIVE
HBV CORE IGM SER QL: NONREACTIVE
HBV SURFACE AG SER QL: NONREACTIVE
HCT VFR BLD CALC: 45.9 %
HCV AB SER QL: NONREACTIVE
HCV S/CO RATIO: 0.1 S/CO
HGB BLD-MCNC: 13.8 G/DL
IMM GRANULOCYTES NFR BLD AUTO: 0.4 %
LYMPHOCYTES # BLD AUTO: 1.28 K/UL
LYMPHOCYTES NFR BLD AUTO: 12.7 %
MAN DIFF?: NORMAL
MCHC RBC-ENTMCNC: 25.9 PG
MCHC RBC-ENTMCNC: 30.1 GM/DL
MCV RBC AUTO: 86.3 FL
MONOCYTES # BLD AUTO: 0.78 K/UL
MONOCYTES NFR BLD AUTO: 7.7 %
NEUTROPHILS # BLD AUTO: 7.66 K/UL
NEUTROPHILS NFR BLD AUTO: 76.1 %
PLATELET # BLD AUTO: 382 K/UL
PROT UR-MCNC: <4 MG/DL
RBC # BLD: 5.32 M/UL
RBC # FLD: 14.4 %
RF+CCP IGG SER-IMP: NEGATIVE
WBC # FLD AUTO: 10.07 K/UL

## 2022-08-04 PROCEDURE — 99203 OFFICE O/P NEW LOW 30 MIN: CPT

## 2022-08-04 NOTE — HISTORY OF PRESENT ILLNESS
[de-identified] : 67 M with no PMHx, only on supplements here for evaluation of leukocytosis. Labs on July 22 showed WBC 11.77. Neutrophil predominant. On repeat lab on 8/3/22, WBC was normal. ANC mildly elevated at 7.66.\par \par Pt has unintentional weight loss of 10 lbs the last 3 months. Endorses fatigue. Denies any fever, night sweats. He has thigh muscles aches, pain in shoulder joints.  Pt saw rheum and being worked up for polymyalgia rheumatica. \par \par For age appropriate cancer screening, pt had recent PSA -normal. Never had colonoscopy. Has been doing stool immuno tests annually. Denies blood in stool. Does not smoke or drink. Works as a .\par \par Denies HA. CP, SOB, abd pain, constipation, diarrhea, melena, hematuria, dysuria. \par \par \par \par \par

## 2022-08-04 NOTE — CONSULT LETTER
[Dear  ___] : Dear  [unfilled], [Consult Letter:] : I had the pleasure of evaluating your patient, [unfilled]. [Please see my note below.] : Please see my note below. [Consult Closing:] : Thank you very much for allowing me to participate in the care of this patient.  If you have any questions, please do not hesitate to contact me. [Sincerely,] : Sincerely, [FreeTextEntry3] : \par Nick Kay MD\par [facility]\par [address]\par [place] [address]\par Tel: [phone]\par Email: [email or web address]

## 2022-08-04 NOTE — ASSESSMENT
[FreeTextEntry1] : 67 M with no PMHx, only on supplements here for evaluation of leukocytosis. Labs on July 22 showed WBC 11.77. Neutrophil predominant. On repeat lab on 8/3/22, WBC was normal. ANC mildly elevated at 7.66.\par \par Pt has unintentional weight loss of 10 lbs the last 3 months. Endorses fatigue. Denies any fever, night sweats. He has thigh muscles aches, pain in shoulder joints.  Pt saw rheum and being worked up for polymyalgia rheumatica.\par \par # Leukocytosis\par - Labs on July 22 showed WBC 11.77. Neutrophil predominant. On repeat lab on 8/3/22, WBC was normal. ANC mildly elevated at 7.66.\par -appears to be transient - normal on repeat lab\par -no increase in immature cells, Hb and platelets are normal\par -no concerning findings\par \par # age appropriate cancer screening\par -recent PSA- normal\par -never had colonoscopy, doing yearly stool immuno tests which have been normal\par \par # muscle aches/joint pain/ fatigue/ wieghtl oss\par -ipossible polymyalgia rheumatica\par -pt has elevated ESR, Crp\par -following up with Dr. Parks\par -on 5 mg prednisone\par \par \par RTC as needed\par

## 2022-08-04 NOTE — REASON FOR VISIT
[Initial Consultation] : an initial consultation for [FreeTextEntry2] : leukocytosis, low platelet, weightloss

## 2022-08-05 ENCOUNTER — APPOINTMENT (OUTPATIENT)
Dept: RADIOLOGY | Facility: CLINIC | Age: 67
End: 2022-08-05

## 2022-08-05 ENCOUNTER — OUTPATIENT (OUTPATIENT)
Dept: OUTPATIENT SERVICES | Facility: HOSPITAL | Age: 67
LOS: 1 days | End: 2022-08-05
Payer: COMMERCIAL

## 2022-08-05 DIAGNOSIS — Z00.8 ENCOUNTER FOR OTHER GENERAL EXAMINATION: ICD-10-CM

## 2022-08-05 PROCEDURE — 73565 X-RAY EXAM OF KNEES: CPT | Mod: 26

## 2022-08-05 PROCEDURE — 73521 X-RAY EXAM HIPS BI 2 VIEWS: CPT | Mod: 26

## 2022-08-05 PROCEDURE — 73030 X-RAY EXAM OF SHOULDER: CPT | Mod: 26,50

## 2022-08-05 PROCEDURE — 73521 X-RAY EXAM HIPS BI 2 VIEWS: CPT

## 2022-08-05 PROCEDURE — 73565 X-RAY EXAM OF KNEES: CPT

## 2022-08-05 PROCEDURE — 73030 X-RAY EXAM OF SHOULDER: CPT

## 2022-08-08 LAB
ANA PAT FLD IF-IMP: ABNORMAL
ANA SER IF-ACNC: ABNORMAL
ENA SS-A AB SER IA-ACNC: <0.2 AL
ENA SS-B AB SER IA-ACNC: <0.2 AL
THYROGLOB AB SERPL-ACNC: <20 IU/ML
THYROPEROXIDASE AB SERPL IA-ACNC: <10 IU/ML

## 2022-08-10 LAB — B BURGDOR DNA SPEC QL NAA+PROBE: NEGATIVE

## 2022-08-16 LAB — HLA-B27 RELATED AG QL: NEGATIVE

## 2022-08-25 ENCOUNTER — APPOINTMENT (OUTPATIENT)
Dept: RHEUMATOLOGY | Facility: CLINIC | Age: 67
End: 2022-08-25

## 2022-08-29 ENCOUNTER — APPOINTMENT (OUTPATIENT)
Dept: RHEUMATOLOGY | Facility: CLINIC | Age: 67
End: 2022-08-29

## 2022-08-29 VITALS
SYSTOLIC BLOOD PRESSURE: 140 MMHG | DIASTOLIC BLOOD PRESSURE: 80 MMHG | WEIGHT: 188 LBS | BODY MASS INDEX: 23.38 KG/M2 | HEIGHT: 75 IN | OXYGEN SATURATION: 99 % | HEART RATE: 63 BPM | TEMPERATURE: 96.2 F

## 2022-08-29 DIAGNOSIS — R76.8 OTHER SPECIFIED ABNORMAL IMMUNOLOGICAL FINDINGS IN SERUM: ICD-10-CM

## 2022-08-29 PROCEDURE — 20610 DRAIN/INJ JOINT/BURSA W/O US: CPT | Mod: 50

## 2022-08-29 PROCEDURE — 99214 OFFICE O/P EST MOD 30 MIN: CPT | Mod: 25

## 2022-08-29 RX ORDER — DICLOFENAC SODIUM 1% 10 MG/G
1 GEL TOPICAL
Qty: 1 | Refills: 0 | Status: ACTIVE | COMMUNITY
Start: 2022-08-29 | End: 1900-01-01

## 2022-08-29 RX ORDER — TRIAMCINOLONE ACETONIDE 80 MG/ML
80 INJECTION, SUSPENSION INTRA-ARTICULAR; INTRAMUSCULAR
Qty: 1 | Refills: 0 | Status: COMPLETED | OUTPATIENT
Start: 2022-08-29

## 2022-08-29 RX ADMIN — TRIAMCINOLONE ACETONIDE 0 MG/ML: 80 INJECTION, SUSPENSION INTRA-ARTICULAR; INTRAMUSCULAR at 00:00

## 2022-08-29 NOTE — HISTORY OF PRESENT ILLNESS
[FreeTextEntry1] : 67-year-old male here for the first follow up states he started feeling unwell >4 months ago. States he had about a 10 pound unintentional weight loss since then. Patient states he noted pain in his groin/ pelvic area with myalgias in that area. He states he saw Vascular w/ work up ok there. He states then the pain progressed to his bilateral shoulders where he would notice soreness and stiffness proximally to the extent that it made him cry.\par Patient states he went to [facility] where he was evaluated for back pain and told he had herniated discs on MRI.\par States he then was sent to pain management where he was told he does not need an injection and likely has polymyalgia rheumatica.\par Patient states he was given a Medrol Dose pack that gave him prompt response to his pain and symptoms in the bilateral shoulders and pelvic area.\par \par Today he states for the PMR when he decreased prednisone 15 mg daily to 12.5 mg daily last week he noted more stiffness/soreness in the b/l shoulders and hip so he increased it back to prednisone 15 mg daily and feels better on it now.\par Today on prednisone 15 mg daily he has good ROM in b/l shoulders, no pelvic/girdle stiffness and able to stand up without using his hands.\par States he notes some achy, chronic soreness in Rt>Lt shoulders today and did xrays to review.\par No temporal pain/unremitting headaches, no vision changes, no jaw pain at any time.\par States he can notice intermittent knee pain with kneeling and denies any crystal arthropathy like attacks.\par Denies any swelling or redness or warmth of any joints.\par Noted on labs to have +JA.\par Denies any fever/chills, no rashes, no ulcers, no dry eyes, no dry mouth now, no raynaud's, no infectious diarrhea or  symptoms at this time.\par \par

## 2022-08-29 NOTE — PHYSICAL EXAM
[General Appearance - Alert] : alert [General Appearance - In No Acute Distress] : in no acute distress [Sclera] : the sclera and conjunctiva were normal [Extraocular Movements] : extraocular movements were intact [Outer Ear] : the ears and nose were normal in appearance [Neck Appearance] : the appearance of the neck was normal [Respiration, Rhythm And Depth] : normal respiratory rhythm and effort [Heart Rate And Rhythm] : heart rate was normal and rhythm regular [Heart Sounds] : normal S1 and S2 [Abdomen Soft] : soft [Abdomen Tenderness] : non-tender [Cervical Lymph Nodes Enlarged Anterior Bilaterally] : anterior cervical [Supraclavicular Lymph Nodes Enlarged Bilaterally] : supraclavicular [No CVA Tenderness] : no ~M costovertebral angle tenderness [Motor Tone] : muscle strength and tone were normal [] : no rash [No Focal Deficits] : no focal deficits [Impaired Insight] : insight and judgment were intact [Mood] : the mood was normal [FreeTextEntry1] : Good ROM in b/l shoulders, no pelvic/girdle stiffness and able to stand up without using his hands; No synovitis or effusion on exam noted today

## 2022-08-29 NOTE — ASSESSMENT
[FreeTextEntry1] : 67-year-old male, here for follow up w/ OA hands; reports he had pelvic/ hip pain that then progressed to bilateral shoulder pain/stiffness that made him cry, w/ high ESR=45 that responded promptly to medrol dose pack concerning for Polymyalgia Rheumatica w/ high CRP=20. \par States he had 10 lb unintentional wt loss & saw Heme/onc w/ workup ok there.\par -today states when he decreased prednisone 15 mg daily to 12.5 mg daily last week he noted more stiffness/soreness in the b/l shoulders and hip so he increased it back to prednisone 15 mg daily and feels better on it \par -today on prednisone 15mg daily he has Good ROM in b/l shoulders, no pelvic/girdle stiffness and able to stand up without using her hands, no temporal pain/unremitting headaches, no vision changes, no jaw pain.\par -discussed r/b/s of adding MTX as steroid sparing agent & pt agreeable and knows to avoid alcohol\par -prescription sent for MTX 2.5mg 4tabs PO weekly as discussed\par -prescription sent for folic acid 1mg daily as discussed \par -labs as below prior to f/u on MTX as discussed\par -reviewed labs 8/3/22 w/ high CRP=20; normal ESR then; CBC/CMP ok; hepatitis negative; +JA 1:320 speckled; DS-DNA neg; C3/C4 WNL; urine prot/creat ratio WNL \par -discussed r/b/s of prednisone 15 mg daily x 2 weeka; then prednisone 12.5 mg daily x 10 days then prednisone 10 mg daily until follow up w/ pt agreeable and prescription sent as below\par -discussed to take Ca+vitD BID while on steroids\par -labs 7/22/22 w/ high ESR=45; CPK normal=60; normal TSH; normal T4; +JA 1:320 homogenous; RF negative; high WBC=11.77 & high obrk=854 (can be reactive to f/u w/ heme/onc also); CMP ok; UA micro w/ calcium oxylate crystals (handout given on foods to avoid)\par -denies any GCA symptoms and knows to alert us if any concerns discussed \par -No synovitis or effusion on exam noted today and advised to monitor.\par -Reviewed xray b/l hips 8/5/22 Rt>Lt hip OA and denies any groin/hip pain at this time\par \par Shoulder pain: reviewed xray b/l shoulders 8/5/22 w/ b/l OA shoulders; Rt shoulder calcific tendonitis \par Rt shoulder pain: offered Kenalog 80 mg injection today w/ r/b/s discussed and patient agreeable.\par Lt shoulder pain: offered Kenalog 80 mg injection today w/ r/b/s discussed and patient agreeable. \par -Has full ROM in b/l shoulders, no pelvic/girdle stiffness and able to stand up without using her hands, no temporal pain/unremitting headaches, no vision changes, no jaw pain.\par \par +JA 1:320 homogenous: repeat JA 1:320 speckled \par -Clinically without much symptoms of lupus at this time and educated on symptoms to monitor for in detail if he evolves.\par -reviewed labs w/ 8/3/22 w/ disease activity markers normal w/ +JA 1:320 speckled; DS-DNA neg; C3/C4 WNL; urine prot/creat ratio WNL\par -thyroid abs Negative; checked as +JA can be seen with cross reactivity\par \par Intermittent Knee pain -Reviewed xray b/l kees 8/5/22 w/ b/l OA\par -discussed r/b/s of voltaren gel 1% PRN w/ pt agreeable and prescription sent as below\par -consider steroid injections if pain \par -hand out given on stretching exercises \par \par -educated on symptoms to monitor for in detail and alert us if any concerns.\par -knows to stay up to date on health maintenance w/ PCP\par -f/u in 4-5 weeks w/ labs please\par \par

## 2022-08-29 NOTE — REASON FOR VISIT
[Follow-Up: _____] : a [unfilled] follow-up visit [FreeTextEntry1] : PMR; OA; review labs/xrays/med; Rt shoulder pain; Lt shoulder pain

## 2022-08-29 NOTE — PROCEDURE
[Today's Date:] : Date: [unfilled] [Therapeutic] : therapeutic [#1 Site: ______] : #1 site identified in the [unfilled] [___ml 1% Lidocaine] : [unfilled] ml of 1% lidocaine [Tolerated Well] : the patient tolerated the procedure well [No Complications] : there were no complications [#2 Site: ___] : # 2 site identified in the [unfilled] [Risks] : risks [Benefits] : benefits [Alternatives] : alternatives [Consent Obtained] : written consent was obtained prior to the procedure and is detailed in the patient's record [Patient] : Prior to the start of the procedure a time out was taken and the identity of the patient was confirmed via name and date of birth with the patient. The correct site and the procedure to be performed were confirmed. The correct side was confirmed if applicable. The availability of the correct equipment was verified [Ethyl Chloride] : ethyl chloride [Betadine] : betadine solution [Alcohol] : alcohol [22 gauge 1.5 inch] : A 22 gauge 1.5 inch needle was used [FreeTextEntry7] : x2 [FreeTextEntry5] : x2 [de-identified] : x2 [de-identified] : Kenalog 80 mg x 2

## 2022-09-22 LAB
ALBUMIN SERPL ELPH-MCNC: 4.2 G/DL
ALP BLD-CCNC: 44 U/L
ALT SERPL-CCNC: 21 U/L
ANION GAP SERPL CALC-SCNC: 14 MMOL/L
AST SERPL-CCNC: 15 U/L
BILIRUB SERPL-MCNC: 1.1 MG/DL
BUN SERPL-MCNC: 20 MG/DL
CALCIUM SERPL-MCNC: 9.3 MG/DL
CHLORIDE SERPL-SCNC: 105 MMOL/L
CO2 SERPL-SCNC: 24 MMOL/L
CREAT SERPL-MCNC: 0.89 MG/DL
CRP SERPL-MCNC: <3 MG/L
EGFR: 94 ML/MIN/1.73M2
GLUCOSE SERPL-MCNC: 84 MG/DL
POTASSIUM SERPL-SCNC: 4.8 MMOL/L
PROT SERPL-MCNC: 5.9 G/DL
SODIUM SERPL-SCNC: 143 MMOL/L

## 2022-09-26 LAB
BASOPHILS # BLD AUTO: 0.06 K/UL
BASOPHILS NFR BLD AUTO: 0.6 %
EOSINOPHIL # BLD AUTO: 0.48 K/UL
EOSINOPHIL NFR BLD AUTO: 4.9 %
ERYTHROCYTE [SEDIMENTATION RATE] IN BLOOD BY WESTERGREN METHOD: 3 MM/HR
HCT VFR BLD CALC: 46 %
HGB BLD-MCNC: 14.8 G/DL
IMM GRANULOCYTES NFR BLD AUTO: 0.4 %
LYMPHOCYTES # BLD AUTO: 0.97 K/UL
LYMPHOCYTES NFR BLD AUTO: 9.9 %
MAN DIFF?: NORMAL
MCHC RBC-ENTMCNC: 28.5 PG
MCHC RBC-ENTMCNC: 32.2 GM/DL
MCV RBC AUTO: 88.6 FL
MONOCYTES # BLD AUTO: 0.88 K/UL
MONOCYTES NFR BLD AUTO: 9 %
NEUTROPHILS # BLD AUTO: 7.36 K/UL
NEUTROPHILS NFR BLD AUTO: 75.2 %
PLATELET # BLD AUTO: 245 K/UL
RBC # BLD: 5.19 M/UL
RBC # FLD: 17.4 %
WBC # FLD AUTO: 9.79 K/UL

## 2022-09-29 ENCOUNTER — APPOINTMENT (OUTPATIENT)
Dept: RHEUMATOLOGY | Facility: CLINIC | Age: 67
End: 2022-09-29

## 2022-09-29 ENCOUNTER — APPOINTMENT (OUTPATIENT)
Dept: NEUROLOGY | Facility: CLINIC | Age: 67
End: 2022-09-29

## 2022-09-29 VITALS
DIASTOLIC BLOOD PRESSURE: 80 MMHG | TEMPERATURE: 96 F | WEIGHT: 190 LBS | HEIGHT: 75 IN | HEART RATE: 60 BPM | SYSTOLIC BLOOD PRESSURE: 130 MMHG | BODY MASS INDEX: 23.62 KG/M2 | OXYGEN SATURATION: 97 %

## 2022-09-29 DIAGNOSIS — Z23 ENCOUNTER FOR IMMUNIZATION: ICD-10-CM

## 2022-09-29 PROCEDURE — G0008: CPT

## 2022-09-29 PROCEDURE — 99214 OFFICE O/P EST MOD 30 MIN: CPT | Mod: 25

## 2022-09-29 PROCEDURE — 90662 IIV NO PRSV INCREASED AG IM: CPT

## 2022-09-29 NOTE — PROCEDURE
[Today's Date:] : Date: [unfilled] [Risks] : risks [Benefits] : benefits [Alternatives] : alternatives [Consent Obtained] : written consent was obtained prior to the procedure and is detailed in the patient's record [Patient] : Prior to the start of the procedure a time out was taken and the identity of the patient was confirmed via name and date of birth with the patient. The correct site and the procedure to be performed were confirmed. The correct side was confirmed if applicable. The availability of the correct equipment was verified [Therapeutic] : therapeutic [Alcohol] : alcohol [25 gauge 1 inch] : A 25 gauge 1 inch needle was used [Tolerated Well] : the patient tolerated the procedure well [No Complications] : there were no complications [de-identified] : Fluzone 0.7ml lot # [id number] exp 6/30/23

## 2022-09-29 NOTE — ASSESSMENT
[FreeTextEntry1] : 67-year-old male, here for follow up w/ OA hands; reports he had pelvic/ hip pain that then progressed to bilateral shoulder pain/stiffness that made him cry, w/ high ESR=45 that responded promptly to medrol dose pack concerning for Polymyalgia Rheumatica w/ high CRP=20. \par States he had 10 lb unintentional wt loss & saw Heme/onc w/ workup ok there. \par \par PMR:\par -today on prednisone 10 mg daily he has Good ROM in b/l shoulders, no pelvic/girdle stiffness and able to stand up without using her hands, no temporal pain/unremitting headaches, no vision changes, no jaw pain.\par -reviewed labs 9/22/22 w/ now normal CRP; normal ESR now; CBC/CMP ok\par -discussed r/b/s of prednisone 10 mg daily x 3 weeks; then prednisone 9 mg daily x 4 weeks until follow up w/ pt agreeable and prescription sent as below\par -discussed to take Ca+vitD BID while on steroids\par -discussed r/b/s of MTX as steroid sparing agent & pt agreeable and knows to avoid alcohol\par -refill sent for MTX 2.5mg 4tabs PO weekly as discussed\par -refill sent for folic acid 1mg daily as discussed \par -labs as below prior to f/u discussed \par -reviewed labs 8/3/22 w/ high CRP=20; normal ESR then; CBC/CMP ok; hepatitis negative; +JA 1:320 speckled; DS-DNA neg; C3/C4 WNL; urine prot/creat ratio WNL \par -labs 7/22/22 w/ high ESR=45; CPK normal=60; normal TSH; normal T4; +JA 1:320 homogenous; RF negative; high WBC=11.77 & high eyco=310 (can be reactive to f/u w/ heme/onc also); CMP ok; UA micro w/ calcium oxylate crystals (handout given on foods to avoid)\par -denies any GCA symptoms and knows to alert us if any concerns discussed \par -No synovitis or effusion on exam noted today and advised to monitor.\par \par OA shoulders: no pain today \par xray b/l shoulders 8/5/22 w/ b/l OA shoulders; Rt shoulder calcific tendonitis \par -Has full ROM in b/l shoulders, no pelvic/girdle stiffness and able to stand up without using her hands, no temporal pain/unremitting headaches, no vision changes, no jaw pain.\par \par Hip OA: no pain today  \par -xray b/l hips 8/5/22 Rt>Lt hip OA and denies any groin/hip pain at this time\par \par +JA 1:320 homogenous: repeat JA 1:320 speckled \par -Clinically without much symptoms of lupus at this time and educated on symptoms to monitor for in detail if he evolves.\par -reviewed labs w/ 8/3/22 w/ disease activity markers normal w/ +JA 1:320 speckled; DS-DNA neg; C3/C4 WNL; urine prot/creat ratio WNL\par -thyroid abs Negative; checked as +JA can be seen with cross reactivity\par \par Knee OA: no pain today \par -xray b/l kees 8/5/22 w/ b/l OA\par -voltaren gel 1% PRN \par -consider steroid injections if pain & defers needing at this time \par \par -educated on symptoms to monitor for in detail and alert us if any concerns.\par -knows to stay up to date on health maintenance w/ PCP\par -f/u in 4-6 weeks w/ labs please\par

## 2022-09-29 NOTE — HISTORY OF PRESENT ILLNESS
[FreeTextEntry1] : 67-year-old male here follow up states he started feeling unwell > 4months ago. States he had about a 10 pound unintentional weight loss since then. Patient states he noted pain in his groin/ pelvic area with myalgias in that area. He states he saw Vascular w/ work up ok there. He states then the pain progressed to his bilateral shoulders where he would notice soreness and stiffness proximally to the extent that it made him cry.\par Patient states he went to [facility] where he was evaluated for back pain and told he had herniated discs on MRI.\par States he then was sent to pain management where he was told he does not need an injection and likely has polymyalgia rheumatica.\par Patient states he was given a Medrol Dose pack that gave him prompt response to his pain and symptoms in the bilateral shoulders and pelvic area.\par \par Today on tapered down prednisone 10 mg daily x 1 week he has good ROM in b/l shoulders, no pelvic/girdle stiffness and able to stand up without using his hands.\par No temporal pain/unremitting headaches, no vision changes, no jaw pain at any time.\par States he has been taking MTX 2.5mg 4 tabs weekly as steroid sparing agent that he is tolerating well. \par States he did labs to review. \par States he can notice intermittent knee pain with kneeling though not much now; and denies any crystal arthropathy like attacks.\par Denies any swelling or redness or warmth of any joints.\par Noted on labs to have +JA.\par Denies any fever/chills, no rashes, no ulcers, no dry eyes, no dry mouth now, no raynaud's, no infectious diarrhea or  symptoms at this time.\par \par States he would like the flu shot today; denies any egg allergy and reports no reaction to it in the past.\par \par \par

## 2022-10-30 LAB
ALBUMIN SERPL ELPH-MCNC: 4.1 G/DL
ALP BLD-CCNC: 46 U/L
ALT SERPL-CCNC: 21 U/L
ANA PAT FLD IF-IMP: ABNORMAL
ANA SER IF-ACNC: ABNORMAL
ANION GAP SERPL CALC-SCNC: 10 MMOL/L
AST SERPL-CCNC: 14 U/L
BASOPHILS # BLD AUTO: 0.06 K/UL
BASOPHILS NFR BLD AUTO: 0.7 %
BILIRUB SERPL-MCNC: 0.6 MG/DL
BUN SERPL-MCNC: 27 MG/DL
CALCIUM SERPL-MCNC: 9.6 MG/DL
CHLORIDE SERPL-SCNC: 103 MMOL/L
CO2 SERPL-SCNC: 30 MMOL/L
CREAT SERPL-MCNC: 0.92 MG/DL
CRP SERPL-MCNC: <3 MG/L
EGFR: 91 ML/MIN/1.73M2
EOSINOPHIL # BLD AUTO: 0.19 K/UL
EOSINOPHIL NFR BLD AUTO: 2.1 %
ERYTHROCYTE [SEDIMENTATION RATE] IN BLOOD BY WESTERGREN METHOD: 2 MM/HR
GLUCOSE SERPL-MCNC: 68 MG/DL
HCT VFR BLD CALC: 50.4 %
HGB BLD-MCNC: 16 G/DL
IMM GRANULOCYTES NFR BLD AUTO: 2 %
LYMPHOCYTES # BLD AUTO: 1.73 K/UL
LYMPHOCYTES NFR BLD AUTO: 19 %
MAN DIFF?: NORMAL
MCHC RBC-ENTMCNC: 28.5 PG
MCHC RBC-ENTMCNC: 31.7 GM/DL
MCV RBC AUTO: 89.7 FL
MONOCYTES # BLD AUTO: 0.81 K/UL
MONOCYTES NFR BLD AUTO: 8.9 %
NEUTROPHILS # BLD AUTO: 6.15 K/UL
NEUTROPHILS NFR BLD AUTO: 67.3 %
PLATELET # BLD AUTO: 333 K/UL
POTASSIUM SERPL-SCNC: 4.8 MMOL/L
PROT SERPL-MCNC: 6.3 G/DL
RBC # BLD: 5.62 M/UL
RBC # FLD: 15.5 %
SODIUM SERPL-SCNC: 143 MMOL/L
WBC # FLD AUTO: 9.12 K/UL

## 2022-10-31 LAB
C3 SERPL-MCNC: 114 MG/DL
C4 SERPL-MCNC: 24 MG/DL

## 2022-11-01 LAB — DSDNA AB SER-ACNC: <12 IU/ML

## 2022-11-03 ENCOUNTER — APPOINTMENT (OUTPATIENT)
Dept: RHEUMATOLOGY | Facility: CLINIC | Age: 67
End: 2022-11-03

## 2022-11-03 VITALS
BODY MASS INDEX: 24.87 KG/M2 | OXYGEN SATURATION: 98 % | SYSTOLIC BLOOD PRESSURE: 120 MMHG | HEIGHT: 75 IN | WEIGHT: 200 LBS | DIASTOLIC BLOOD PRESSURE: 80 MMHG | HEART RATE: 62 BPM | TEMPERATURE: 97.9 F

## 2022-11-03 PROCEDURE — 99214 OFFICE O/P EST MOD 30 MIN: CPT

## 2022-11-03 NOTE — HISTORY OF PRESENT ILLNESS
[FreeTextEntry1] : 67-year-old male here follow up states he started feeling unwell > 4 months ago. States he had about a 10 pound unintentional weight loss since then. Patient states he noted pain in his groin/ pelvic area with myalgias in that area. He states he saw Vascular w/ work up ok there. He states then the pain progressed to his bilateral shoulders where he would notice soreness and stiffness proximally to the extent that it made him cry.\par Patient states he went to [facility] where he was evaluated for back pain and told he had herniated discs on MRI.\par States he then was sent to pain management where he was told he does not need an injection and likely has polymyalgia rheumatica.\par Patient states he was given a Medrol Dose pack that gave him prompt response to his pain and symptoms in the bilateral shoulders and pelvic area.\par \par Today on tapered down prednisone 9 mg daily x 2 weeks he has good ROM in b/l shoulders, no pelvic/girdle stiffness and able to stand up without using his hands.\par No temporal pain/unremitting headaches, no vision changes, no jaw pain at any time.\par States he has been taking MTX 2.5 mg 4 tabs weekly as steroid sparing agent that he is tolerating well. \par States he did labs to review. \par States he can notice intermittent knee pain with kneeling though not much now; and denies any crystal arthropathy like attacks.\par Denies any swelling or redness or warmth of any joints.\par Noted on labs to have +JA.\par Denies any fever/chills, no rashes, no ulcers, no dry eyes, no dry mouth now, no raynaud's, no infectious diarrhea or  symptoms at this time.\par \par \par \par

## 2022-11-04 LAB
CREAT SPEC-SCNC: 39 MG/DL
CREAT/PROT UR: 0.1 RATIO
PROT UR-MCNC: 4 MG/DL

## 2022-12-07 ENCOUNTER — APPOINTMENT (OUTPATIENT)
Dept: RHEUMATOLOGY | Facility: CLINIC | Age: 67
End: 2022-12-07

## 2022-12-07 VITALS
SYSTOLIC BLOOD PRESSURE: 130 MMHG | HEIGHT: 75 IN | BODY MASS INDEX: 25.36 KG/M2 | DIASTOLIC BLOOD PRESSURE: 90 MMHG | OXYGEN SATURATION: 98 % | WEIGHT: 204 LBS | TEMPERATURE: 98.4 F | HEART RATE: 65 BPM

## 2022-12-07 DIAGNOSIS — R76.8 OTHER SPECIFIED ABNORMAL IMMUNOLOGICAL FINDINGS IN SERUM: ICD-10-CM

## 2022-12-07 DIAGNOSIS — M25.511 PAIN IN RIGHT SHOULDER: ICD-10-CM

## 2022-12-07 DIAGNOSIS — M25.512 PAIN IN RIGHT SHOULDER: ICD-10-CM

## 2022-12-07 LAB
ALBUMIN SERPL ELPH-MCNC: 4.6 G/DL
ALP BLD-CCNC: 59 U/L
ALT SERPL-CCNC: 17 U/L
ANION GAP SERPL CALC-SCNC: 11 MMOL/L
AST SERPL-CCNC: 18 U/L
BASOPHILS # BLD AUTO: 0.08 K/UL
BASOPHILS NFR BLD AUTO: 0.8 %
BILIRUB SERPL-MCNC: 1 MG/DL
BUN SERPL-MCNC: 30 MG/DL
CALCIUM SERPL-MCNC: 9.5 MG/DL
CHLORIDE SERPL-SCNC: 101 MMOL/L
CO2 SERPL-SCNC: 30 MMOL/L
CREAT SERPL-MCNC: 0.96 MG/DL
CRP SERPL-MCNC: 8 MG/L
EGFR: 87 ML/MIN/1.73M2
EOSINOPHIL # BLD AUTO: 0.28 K/UL
EOSINOPHIL NFR BLD AUTO: 2.9 %
ERYTHROCYTE [SEDIMENTATION RATE] IN BLOOD BY WESTERGREN METHOD: < 2 MM/HR
GLUCOSE SERPL-MCNC: 91 MG/DL
HCT VFR BLD CALC: 48.2 %
HGB BLD-MCNC: 15.4 G/DL
IMM GRANULOCYTES NFR BLD AUTO: 0.5 %
LYMPHOCYTES # BLD AUTO: 1.38 K/UL
LYMPHOCYTES NFR BLD AUTO: 14.5 %
MAN DIFF?: NORMAL
MCHC RBC-ENTMCNC: 29.6 PG
MCHC RBC-ENTMCNC: 32 GM/DL
MCV RBC AUTO: 92.5 FL
MONOCYTES # BLD AUTO: 0.75 K/UL
MONOCYTES NFR BLD AUTO: 7.9 %
NEUTROPHILS # BLD AUTO: 6.96 K/UL
NEUTROPHILS NFR BLD AUTO: 73.4 %
PLATELET # BLD AUTO: 313 K/UL
POTASSIUM SERPL-SCNC: 4.8 MMOL/L
PROT SERPL-MCNC: 6.7 G/DL
RBC # BLD: 5.21 M/UL
RBC # FLD: 14.2 %
SODIUM SERPL-SCNC: 141 MMOL/L
WBC # FLD AUTO: 9.5 K/UL

## 2022-12-07 PROCEDURE — 99214 OFFICE O/P EST MOD 30 MIN: CPT | Mod: 25

## 2022-12-07 PROCEDURE — 20610 DRAIN/INJ JOINT/BURSA W/O US: CPT | Mod: 50

## 2022-12-07 RX ORDER — TRIAMCINOLONE ACETONIDE 40 MG/ML
40 SUSPENSION INTRA-ARTERIAL; INTRAMUSCULAR
Qty: 1 | Refills: 0 | Status: COMPLETED | OUTPATIENT
Start: 2022-12-07

## 2022-12-07 RX ADMIN — TRIAMCINOLONE ACETONIDE 0 MG/ML: 40 INJECTION, SUSPENSION INTRA-ARTICULAR; INTRAMUSCULAR at 00:00

## 2022-12-07 NOTE — REASON FOR VISIT
[Follow-Up: _____] : a [unfilled] follow-up visit [FreeTextEntry1] : PMR; OA; review labs/meds; Rt shoulder pain; Lt shoulder pain \par  \par

## 2022-12-07 NOTE — ASSESSMENT
[FreeTextEntry1] : 67-year-old male, here for follow up w/ OA hands; reports he had pelvic/ hip pain that then progressed to bilateral shoulder pain/stiffness that made him cry, w/ high ESR=45 that responded promptly to medrol dose pack concerning for Polymyalgia Rheumatica w/ high CRP=20. \par States he had 10 lb unintentional wt loss & saw Heme/onc w/ workup ok there. \par \par PMR: today has 3/10 pain in b/l shoulders w/ raised CRP=8 (from < 3 normal)\par -discussed r/b/s of increasing MTX as steroid sparing agent & pt agreeable and knows to avoid alcohol\par -prescription sent for MTX 2.5 mg 6 tabs PO weekly as discussed\par -refill sent for folic acid 1 mg daily as discussed \par -labs as below prior to f/u discussed \par -today on prednisone 8 mg daily x 3 weeks he has Good ROM in b/l shoulders but w/ tenderness b/l, no pelvic/girdle stiffness and able to stand up without using her hands, no temporal pain/unremitting headaches, no vision changes, no jaw pain.\par -reviewed labs 12/5/2222 w/ high CRP=8 (from < 3 normal); normal ESR now; CBC/CMP ok; 11/3/22 urine prot/creat ratio=0.1 10/27/22 +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; DS-DNA neg; C3/C4 WNL\par -discussed r/b/s of prednisone 8 mg daily x 1 week; then prednisone 7 mg daily x 4 weeks until follow up w/ pt agreeable and prescription sent as below\par -discussed to take Ca+vitD BID while on steroids\par -reviewed labs 8/3/22 w/ high CRP=20; normal ESR then; CBC/CMP ok; hepatitis negative; +JA 1:320 speckled; DS-DNA neg; C3/C4 WNL; urine prot/creat ratio WNL \par -labs 7/22/22 w/ high ESR=45; CPK normal=60; normal TSH; normal T4; +JA 1:320 homogenous; RF negative; high WBC=11.77 & high wkso=332 (can be reactive to f/u w/ heme/onc also); CMP ok; UA micro w/ calcium oxylate crystals (handout given on foods to avoid)\par -denies any GCA symptoms and knows to alert us if any concerns discussed \par -No synovitis or effusion on exam noted today and advised to monitor.\par \par OA shoulders: xray b/l shoulders 8/5/22 w/ b/l OA shoulders; Rt shoulder calcific tendonitis \par Rt shoulder pain: offered Kenalog 40 mg injection today w/ r/b/s discussed and patient agreeable.\par Lt shoulder pain: offered Kenalog 40 mg injection today w/ r/b/s discussed and patient agreeable.\par -Has good ROM in b/l shoulders, no pelvic/girdle stiffness and able to stand up without using her hands, no temporal pain/unremitting headaches, no vision changes, no jaw pain.\par \par Hip OA: no pain today \par -xray b/l hips 8/5/22 Rt>Lt hip OA and denies any groin/hip pain at this time\par \par +JA 1:320 homogenous: repeat 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\par -Clinically without much symptoms of lupus at this time and educated on symptoms to monitor for in detail if he evolves.\par -reviewed labs w/ 10/27/22 w/ disease activity markers normal w/ +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; DS-DNA neg; C3/C4 WNL; urine prot/creat ratio=0.1 on 11/3/22; labs as below to monitor \par -thyroid abs Negative; checked as +JA can be seen with cross reactivity\par \par Knee OA: no pain today \par -xray b/l kees 8/5/22 w/ b/l OA\par -voltaren gel 1% PRN \par -consider steroid injections if pain \par \par -educated on symptoms to monitor for in detail and alert us if any concerns.\par -knows to stay up to date on health maintenance w/ PCP\par -f/u in 4-5 weeks w/ labs on higher dose of MTX please\par

## 2022-12-07 NOTE — PROCEDURE
[Today's Date:] : Date: [unfilled] [Risks] : risks [Benefits] : benefits [Alternatives] : alternatives [Consent Obtained] : written consent was obtained prior to the procedure and is detailed in the patient's record [Patient] : Prior to the start of the procedure a time out was taken and the identity of the patient was confirmed via name and date of birth with the patient. The correct site and the procedure to be performed were confirmed. The correct side was confirmed if applicable. The availability of the correct equipment was verified [Therapeutic] : therapeutic [#1 Site: ______] : #1 site identified in the [unfilled] [Ethyl Chloride] : ethyl chloride [Betadine] : betadine solution [Alcohol] : alcohol [22 gauge 1.5 inch] : A 22 gauge 1.5 inch needle was used [___ml 1% Lidocaine] : [unfilled] ml of 1% lidocaine [Tolerated Well] : the patient tolerated the procedure well [No Complications] : there were no complications [#2 Site: ___] : # 2 site identified in the [unfilled] [FreeTextEntry7] : x2 [FreeTextEntry5] : x2 [de-identified] : x2 [de-identified] : Kenalog 40 mg x 2

## 2022-12-07 NOTE — HISTORY OF PRESENT ILLNESS
[FreeTextEntry1] : 67-year-old male here follow up states he started feeling unwell few months ago. States he had about a 10 pound unintentional weight loss since then. Patient states he noted pain in his groin/ pelvic area with myalgias in that area. He states he saw Vascular w/ work up ok there. He states then the pain progressed to his bilateral shoulders where he would notice soreness and stiffness proximally to the extent that it made him cry.\par Patient states he went to [facility] where he was evaluated for back pain and told he had herniated discs on MRI.\par States he then was sent to pain management where he was told he does not need an injection and likely has polymyalgia rheumatica.\par Patient states he was given a Medrol Dose pack that gave him prompt response to his pain and symptoms in the bilateral shoulders and pelvic area.\par \par Today on tapered down prednisone 8 mg daily x 3 weeks he has good ROM in b/l shoulders but with achy pain rated 3/10 for severity in b/l shoulders now. \par No pelvic/girdle stiffness and able to stand up without using his hands.\par No temporal pain/unremitting headaches, no vision changes, no jaw pain at any time.\par States he has been taking MTX 2.5 mg 4 tabs weekly as steroid sparing agent that he is tolerating well. \par States he did labs to review. \par States he can notice intermittent knee pain with kneeling though not much now; and denies any crystal arthropathy like attacks.\par Denies any swelling or redness or warmth of any joints.\par Noted on labs to have +JA.\par Denies any fever/chills, no rashes, no ulcers, no dry eyes, no dry mouth now, no raynaud's, no infectious diarrhea or  symptoms at this time.\par \par

## 2022-12-07 NOTE — PHYSICAL EXAM
[General Appearance - Alert] : alert [General Appearance - In No Acute Distress] : in no acute distress [Sclera] : the sclera and conjunctiva were normal [Extraocular Movements] : extraocular movements were intact [Outer Ear] : the ears and nose were normal in appearance [Neck Appearance] : the appearance of the neck was normal [Respiration, Rhythm And Depth] : normal respiratory rhythm and effort [Heart Rate And Rhythm] : heart rate was normal and rhythm regular [Heart Sounds] : normal S1 and S2 [Abdomen Soft] : soft [Abdomen Tenderness] : non-tender [Cervical Lymph Nodes Enlarged Anterior Bilaterally] : anterior cervical [Supraclavicular Lymph Nodes Enlarged Bilaterally] : supraclavicular [No CVA Tenderness] : no ~M costovertebral angle tenderness [Motor Tone] : muscle strength and tone were normal [] : no rash [No Focal Deficits] : no focal deficits [Impaired Insight] : insight and judgment were intact [Mood] : the mood was normal [FreeTextEntry1] : tenderness in b/l shoulder w/ good ROM; able to stand up w/o using his hands; No synovitis or effusion on exam noted today

## 2023-01-05 ENCOUNTER — APPOINTMENT (OUTPATIENT)
Dept: RHEUMATOLOGY | Facility: CLINIC | Age: 68
End: 2023-01-05
Payer: COMMERCIAL

## 2023-01-05 ENCOUNTER — RX RENEWAL (OUTPATIENT)
Age: 68
End: 2023-01-05

## 2023-01-05 VITALS
WEIGHT: 198 LBS | OXYGEN SATURATION: 99 % | DIASTOLIC BLOOD PRESSURE: 70 MMHG | BODY MASS INDEX: 24.75 KG/M2 | SYSTOLIC BLOOD PRESSURE: 118 MMHG | TEMPERATURE: 97.7 F | HEART RATE: 68 BPM

## 2023-01-05 DIAGNOSIS — R17 UNSPECIFIED JAUNDICE: ICD-10-CM

## 2023-01-05 LAB
ALBUMIN SERPL ELPH-MCNC: 4.5 G/DL
ALP BLD-CCNC: 47 U/L
ALT SERPL-CCNC: 21 U/L
ANA SER IF-ACNC: NEGATIVE
ANION GAP SERPL CALC-SCNC: 8 MMOL/L
AST SERPL-CCNC: 17 U/L
BASOPHILS # BLD AUTO: 0.05 K/UL
BASOPHILS NFR BLD AUTO: 0.4 %
BILIRUB SERPL-MCNC: 1.4 MG/DL
BUN SERPL-MCNC: 23 MG/DL
C3 SERPL-MCNC: 125 MG/DL
C4 SERPL-MCNC: 24 MG/DL
CALCIUM SERPL-MCNC: 10 MG/DL
CHLORIDE SERPL-SCNC: 103 MMOL/L
CO2 SERPL-SCNC: 33 MMOL/L
CREAT SERPL-MCNC: 0.89 MG/DL
CREAT SPEC-SCNC: 159 MG/DL
CREAT/PROT UR: 0.1 RATIO
CRP SERPL-MCNC: <3 MG/L
DSDNA AB SER-ACNC: <12 IU/ML
EGFR: 94 ML/MIN/1.73M2
EOSINOPHIL # BLD AUTO: 0.16 K/UL
EOSINOPHIL NFR BLD AUTO: 1.4 %
ERYTHROCYTE [SEDIMENTATION RATE] IN BLOOD BY WESTERGREN METHOD: < 2 MM/HR
GLUCOSE SERPL-MCNC: 71 MG/DL
HCT VFR BLD CALC: 50.4 %
HGB BLD-MCNC: 16 G/DL
IMM GRANULOCYTES NFR BLD AUTO: 0.5 %
LYMPHOCYTES # BLD AUTO: 1.39 K/UL
LYMPHOCYTES NFR BLD AUTO: 11.9 %
MAN DIFF?: NORMAL
MCHC RBC-ENTMCNC: 30.5 PG
MCHC RBC-ENTMCNC: 31.7 GM/DL
MCV RBC AUTO: 96.2 FL
MONOCYTES # BLD AUTO: 0.99 K/UL
MONOCYTES NFR BLD AUTO: 8.5 %
NEUTROPHILS # BLD AUTO: 8.99 K/UL
NEUTROPHILS NFR BLD AUTO: 77.3 %
PLATELET # BLD AUTO: 273 K/UL
POTASSIUM SERPL-SCNC: 4.8 MMOL/L
PROT SERPL-MCNC: 6.4 G/DL
PROT UR-MCNC: 10 MG/DL
RBC # BLD: 5.24 M/UL
RBC # FLD: 14.1 %
SODIUM SERPL-SCNC: 144 MMOL/L
WBC # FLD AUTO: 11.64 K/UL

## 2023-01-05 PROCEDURE — 99214 OFFICE O/P EST MOD 30 MIN: CPT

## 2023-01-05 NOTE — ASSESSMENT
[FreeTextEntry1] : 67-year-old male, here for follow up w/ OA hands; reports he had pelvic/ hip pain that then progressed to bilateral shoulder pain/stiffness that made him cry, w/ high ESR=45 that responded promptly to medrol dose pack concerning for Polymyalgia Rheumatica w/ high CRP=20. \par States he had 10 lb unintentional wt loss & saw Heme/onc w/ workup ok there. \par \par PMR: today states no pain on prednisone and MTX today \par -today on prednisone 7 mg daily x 3 weeks he has Good ROM in b/l shoulders without tenderness; no pelvic/girdle stiffness and able to stand up without using his hands, no temporal pain/unremitting headaches, no vision changes, no jaw pain.\par -reviewed labs 12/27/22 w/ normal ESR; normal CRP now; JA negative; CBC w/ mildly high WBC=11.64 (denies any signs of infection; can be from prednisone; saw Heme/onc for it w/ workup ok); CMP w/ raised tot bili=1.4 (asymptomatic and told to monitor w/ PCP please) \par -discussed r/b/s of prednisone 7 mg daily x 1 week; then prednisone 6 mg daily x 4 weeks until follow up w/ pt agreeable and prescription sent as below\par -discussed to take Ca+vitD BID while on steroids\par -refill MTX 2.5 mg 6 tabs PO weekly as discussed\par -refill sent for folic acid 1 mg daily as discussed \par -labs as below prior to f/u discussed \par -denies any GCA symptoms and knows to alert us if any concerns discussed \par -labs 12/5/2222 w/ high CRP=8 (from < 3 normal); normal ESR now; CBC/CMP ok; 11/3/22 urine prot/creat ratio=0.1 10/27/22 +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; DS-DNA neg; C3/C4 WNL\par -labs 8/3/22 w/ high CRP=20; normal ESR then; CBC/CMP ok; hepatitis negative; +JA 1:320 speckled; DS-DNA neg; C3/C4 WNL; urine prot/creat ratio WNL \par -labs 7/22/22 w/ high ESR=45; CPK normal=60; normal TSH; normal T4; +JA 1:320 homogenous; RF negative; high WBC=11.77 & high utnh=533 (can be reactive to f/u w/ heme/onc also); CMP ok; UA micro w/ calcium oxylate crystals (handout given on foods to avoid)\par -No synovitis or effusion on exam noted today and advised to monitor.\par \par OA shoulders: xray b/l shoulders 8/5/22 w/ b/l OA shoulders; Rt shoulder calcific tendonitis \par -resolved pain after steroid injections last visit \par -Has good ROM in b/l shoulders, no pelvic/girdle stiffness and able to stand up without using her hands, no temporal pain/unremitting headaches, no vision changes, no jaw pain.\par \par Hip OA: no pain today \par -xray b/l hips 8/5/22 Rt>Lt hip OA and denies any groin/hip pain at this time\par \par +AJ 1:640 homogenous: repeat JA Negative on labs 12/27/22\par -Clinically without much symptoms of lupus at this time and educated on symptoms to monitor for in detail if he evolves.\par -reviewed labs w/ 12/27/22 w/ disease activity markers normal w/ JA negative; DS-DNA neg; C3/C4 WNL; urine prot/creat ratio=0.1 \par -thyroid abs Negative; checked as +JA can be seen with cross reactivity\par \par Knee OA: no pain today \par -xray b/l kees 8/5/22 w/ b/l OA\par -voltaren gel 1% PRN \par -consider steroid injections if pain \par \par -educated on symptoms to monitor for in detail and alert us if any concerns.\par -knows to stay up to date on health maintenance w/ PCP\par -f/u in 4-5 weeks w/ labs please\par . \par 
Abdomen soft, non-tender, no guarding.

## 2023-01-05 NOTE — HISTORY OF PRESENT ILLNESS
[FreeTextEntry1] : 67-year-old male here follow up states he started feeling unwell few months ago. States he had about a 10 pound unintentional weight loss since then. Patient states he noted pain in his groin/ pelvic area with myalgias in that area. He states he saw [facility] w/ work up ok there. He states then the pain progressed to his bilateral shoulders where he would notice soreness and stiffness proximally to the extent that it made him cry.\par Patient states he went to [facility] where he was evaluated for back pain and told he had herniated discs on MRI.\par States he then was sent to pain management where he was told he does not need an injection and likely has polymyalgia rheumatica.\par Patient states he was given a Medrol Dose pack that gave him prompt response to his pain and symptoms in the bilateral shoulders and pelvic area.\par \par Today on tapered down prednisone 7 mg daily x 3 weeks he has good ROM in b/l shoulders without tenderness now.\par No pelvic/girdle stiffness and able to stand up without using his hands.\par No temporal pain/unremitting headaches, no vision changes, no jaw pain at any time.\par States he has been taking MTX 2.5 mg 6 tabs weekly as steroid sparing agent that he is tolerating well. \par States he did labs to review. \par States he can notice intermittent knee pain with kneeling though not much now; and denies any crystal arthropathy like attacks.\par Denies any swelling or redness or warmth of any joints.\par Noted on labs to have +JA.\par Denies any fever/chills, no rashes, no ulcers, no dry eyes, no dry mouth now, no raynaud's, no infectious diarrhea or  symptoms at this time.\par \par \par

## 2023-01-24 ENCOUNTER — LABORATORY RESULT (OUTPATIENT)
Age: 68
End: 2023-01-24

## 2023-02-02 ENCOUNTER — APPOINTMENT (OUTPATIENT)
Dept: RHEUMATOLOGY | Facility: CLINIC | Age: 68
End: 2023-02-02
Payer: COMMERCIAL

## 2023-02-02 VITALS
SYSTOLIC BLOOD PRESSURE: 120 MMHG | WEIGHT: 200 LBS | DIASTOLIC BLOOD PRESSURE: 80 MMHG | OXYGEN SATURATION: 96 % | TEMPERATURE: 98.1 F | HEART RATE: 66 BPM | HEIGHT: 75 IN | BODY MASS INDEX: 24.87 KG/M2

## 2023-02-02 PROCEDURE — 99214 OFFICE O/P EST MOD 30 MIN: CPT

## 2023-02-02 NOTE — ASSESSMENT
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 \par States he had 10 lb unintentional wt loss & saw Heme/onc w/ workup ok there. \par \par PMR: today states no pain on prednisone and MTX today \par -today on prednisone 6 mg daily x 3 weeks he has Good ROM in b/l shoulders without tenderness; no pelvic/girdle stiffness and able to stand up without using his hands, no temporal pain/unremitting headaches, no vision changes, no jaw pain.\par -reviewed labs 1/24/23 w/ normal ESR; normal CRP; CBC ok; CMP ok; 12/27/22 w/ normal ESR; normal CRP now; JA negative; CBC w/ mildly high WBC=11.64 (denies any signs of infection; can be from prednisone; saw Heme/onc for it w/ workup ok); CMP w/ raised tot bili=1.4 (asymptomatic and told to monitor w/ PCP please) \par -discussed r/b/s of prednisone 6 mg daily x 1 week; then prednisone 5 mg daily x 4 weeks until follow up w/ pt agreeable and prescription sent as below\par -discussed to take Ca+vitD BID while on steroids\par -refill MTX 2.5 mg 6 tabs PO weekly as discussed\par -refill sent for folic acid 1 mg daily as discussed \par -labs as below prior to f/u discussed \par -denies any GCA symptoms and knows to alert us if any concerns discussed \par -labs 12/5/2222 w/ high CRP=8 (from < 3 normal); normal ESR now; CBC/CMP ok; 11/3/22 urine prot/creat ratio=0.1 10/27/22 +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; DS-DNA neg; C3/C4 WNL\par -labs 8/3/22 w/ high CRP=20; normal ESR then; CBC/CMP ok; hepatitis negative; +JA 1:320 speckled; DS-DNA neg; C3/C4 WNL; urine prot/creat ratio WNL \par -labs 7/22/22 w/ high ESR=45; CPK normal=60; normal TSH; normal T4; +JA 1:320 homogenous; RF negative; high WBC=11.77 & high rtci=422 (can be reactive to f/u w/ heme/onc also); CMP ok; UA micro w/ calcium oxylate crystals (handout given on foods to avoid)\par -No synovitis or effusion on exam noted today and advised to monitor.\par \par OA shoulders: xray b/l shoulders 8/5/22 w/ b/l OA shoulders; Rt shoulder calcific tendonitis \par -resolved pain after steroid injections last visit \par -Has good ROM in b/l shoulders, no pelvic/girdle stiffness and able to stand up without using her hands, no temporal pain/unremitting headaches, no vision changes, no jaw pain.\par \par Hip OA: no pain today \par -xray b/l hips 8/5/22 Rt>Lt hip OA and denies any groin/hip pain at this time\par \par +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: repeat JA Negative on labs 12/27/22\par -Clinically without much symptoms of lupus at this time and educated on symptoms to monitor for in detail if he evolves.\par -reviewed labs w/ 12/27/22 w/ disease activity markers normal w/ JA negative; DS-DNA neg; C3/C4 WNL; urine prot/creat ratio=0.1 \par -thyroid abs Negative; checked as +JA can be seen with cross reactivity\par \par Knee OA: no pain today \par -xray b/l kees 8/5/22 w/ b/l OA\par -voltaren gel 1% PRN \par -consider steroid injections if pain \par \par -educated on symptoms to monitor for in detail and alert us if any concerns.\par -knows to stay up to date on health maintenance w/ PCP\par -f/u in 4-5 weeks w/ labs please\par \par

## 2023-02-02 NOTE — REASON FOR VISIT
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 Cardiovascular    Hypertension              Exercise tolerance: <4 METS     GI/Hepatic/Renal               Comments: Feeding difficulties Endo/Other             Other Findings              Physical Exam    Airway          Tracheostomy present   Cardiovascular    Rhythm: regular  Rate: normal         Dental         Pulmonary  Breath sounds clear to auscultation               Abdominal         Other Findings            Anesthetic Plan    ASA: 4  Anesthesia type: total IV anesthesia            Anesthetic plan and risks discussed with: Patient      Consent given to GI by POA aunt [Follow-Up: _____] : a [unfilled] follow-up visit [FreeTextEntry1] : PMR; OA; review labs/meds\par \par  \par

## 2023-02-02 NOTE — PHYSICAL EXAM
[General Appearance - Alert] : alert [General Appearance - In No Acute Distress] : in no acute distress [Sclera] : the sclera and conjunctiva were normal [Extraocular Movements] : extraocular movements were intact [Outer Ear] : the ears and nose were normal in appearance [Neck Appearance] : the appearance of the neck was normal [Respiration, Rhythm And Depth] : normal respiratory rhythm and effort [Heart Rate And Rhythm] : heart rate was normal and rhythm regular [Heart Sounds] : normal S1 and S2 [Abdomen Tenderness] : non-tender [Abdomen Soft] : soft [Cervical Lymph Nodes Enlarged Anterior Bilaterally] : anterior cervical [Supraclavicular Lymph Nodes Enlarged Bilaterally] : supraclavicular [No CVA Tenderness] : no ~M costovertebral angle tenderness [Motor Tone] : muscle strength and tone were normal [] : no rash [No Focal Deficits] : no focal deficits [Impaired Insight] : insight and judgment were intact [Mood] : the mood was normal [FreeTextEntry1] : Good ROM in b/l shoulders, no pelvic/girdle stiffness and able to stand up without using his hands; No synovitis or effusion on exam noted today

## 2023-02-02 NOTE — HISTORY OF PRESENT ILLNESS
[FreeTextEntry1] : 68-year-old male here follow up states he started feeling unwell few months ago. States he had about a 10 pound unintentional weight loss since then. Patient states he noted pain in his groin/ pelvic area with myalgias in that area. He states he saw Vascular w/ work up ok there. He states then the pain progressed to his bilateral shoulders where he would notice soreness and stiffness proximally to the extent that it made him cry.\par Patient states he went to [facility] where he was evaluated for back pain and told he had herniated discs on MRI.\par States he then was sent to pain management where he was told he does not need an injection and likely has polymyalgia rheumatica.\par Patient states he was given a Medrol Dose pack that gave him prompt response to his pain and symptoms in the bilateral shoulders and pelvic area.\par \par Today on tapered down prednisone 6 mg daily x 3 weeks he has good ROM in b/l shoulders without tenderness now.\par No pelvic/girdle stiffness and able to stand up without using his hands.\par No temporal pain/unremitting headaches, no vision changes, no jaw pain at any time.\par States he has been taking MTX 2.5 mg 6 tabs weekly as steroid sparing agent that he is tolerating well. \par States he did labs to review. \par States he can notice intermittent knee pain with kneeling though not much now; and denies any crystal arthropathy like attacks.\par Denies any swelling or redness or warmth of any joints.\par Noted on labs to have +JA.\par Denies any fever/chills, no rashes, no ulcers, no dry eyes, no dry mouth now, no raynaud's, no infectious diarrhea or  symptoms at this time.\par \par

## 2023-03-01 LAB
ALBUMIN SERPL ELPH-MCNC: 4.6 G/DL
ALP BLD-CCNC: 71 U/L
ALT SERPL-CCNC: 22 U/L
ANION GAP SERPL CALC-SCNC: 12 MMOL/L
AST SERPL-CCNC: 19 U/L
BASOPHILS # BLD AUTO: 0.1 K/UL
BASOPHILS NFR BLD AUTO: 0.8 %
BILIRUB SERPL-MCNC: 0.9 MG/DL
BUN SERPL-MCNC: 21 MG/DL
CALCIUM SERPL-MCNC: 9.8 MG/DL
CHLORIDE SERPL-SCNC: 101 MMOL/L
CO2 SERPL-SCNC: 27 MMOL/L
CREAT SERPL-MCNC: 0.88 MG/DL
CRP SERPL-MCNC: 4 MG/L
EGFR: 94 ML/MIN/1.73M2
EOSINOPHIL # BLD AUTO: 0.15 K/UL
EOSINOPHIL NFR BLD AUTO: 1.1 %
ERYTHROCYTE [SEDIMENTATION RATE] IN BLOOD BY WESTERGREN METHOD: < 2 MM/HR
GLUCOSE SERPL-MCNC: 105 MG/DL
HCT VFR BLD CALC: 48.3 %
HGB BLD-MCNC: 15.6 G/DL
IMM GRANULOCYTES NFR BLD AUTO: 0.6 %
LYMPHOCYTES # BLD AUTO: 0.85 K/UL
LYMPHOCYTES NFR BLD AUTO: 6.4 %
MAN DIFF?: NORMAL
MCHC RBC-ENTMCNC: 30.2 PG
MCHC RBC-ENTMCNC: 32.3 GM/DL
MCV RBC AUTO: 93.4 FL
MONOCYTES # BLD AUTO: 0.76 K/UL
MONOCYTES NFR BLD AUTO: 5.7 %
NEUTROPHILS # BLD AUTO: 11.32 K/UL
NEUTROPHILS NFR BLD AUTO: 85.4 %
PLATELET # BLD AUTO: 343 K/UL
POTASSIUM SERPL-SCNC: 5.2 MMOL/L
PROT SERPL-MCNC: 6.3 G/DL
RBC # BLD: 5.17 M/UL
RBC # FLD: 12.7 %
SODIUM SERPL-SCNC: 139 MMOL/L
WBC # FLD AUTO: 13.26 K/UL

## 2023-03-09 ENCOUNTER — APPOINTMENT (OUTPATIENT)
Dept: RHEUMATOLOGY | Facility: CLINIC | Age: 68
End: 2023-03-09
Payer: COMMERCIAL

## 2023-03-09 VITALS
TEMPERATURE: 97.2 F | DIASTOLIC BLOOD PRESSURE: 72 MMHG | BODY MASS INDEX: 25.49 KG/M2 | HEART RATE: 67 BPM | SYSTOLIC BLOOD PRESSURE: 118 MMHG | WEIGHT: 205 LBS | HEIGHT: 75 IN | OXYGEN SATURATION: 96 %

## 2023-03-09 DIAGNOSIS — M25.511 PAIN IN RIGHT SHOULDER: ICD-10-CM

## 2023-03-09 DIAGNOSIS — M25.512 PAIN IN LEFT SHOULDER: ICD-10-CM

## 2023-03-09 PROCEDURE — 99214 OFFICE O/P EST MOD 30 MIN: CPT | Mod: 25

## 2023-03-09 PROCEDURE — 20610 DRAIN/INJ JOINT/BURSA W/O US: CPT | Mod: 50

## 2023-03-09 RX ORDER — TRIAMCINOLONE ACETONIDE 40 MG/ML
40 SUSPENSION INTRA-ARTERIAL; INTRAMUSCULAR
Qty: 1 | Refills: 0 | Status: COMPLETED | OUTPATIENT
Start: 2023-03-09

## 2023-03-09 RX ADMIN — TRIAMCINOLONE ACETONIDE 0 MG/ML: 40 INJECTION, SUSPENSION INTRA-ARTICULAR; INTRAMUSCULAR at 00:00

## 2023-03-09 NOTE — PHYSICAL EXAM
[General Appearance - Alert] : alert [General Appearance - In No Acute Distress] : in no acute distress [Sclera] : the sclera and conjunctiva were normal [Extraocular Movements] : extraocular movements were intact [Outer Ear] : the ears and nose were normal in appearance [Neck Appearance] : the appearance of the neck was normal [Respiration, Rhythm And Depth] : normal respiratory rhythm and effort [Heart Rate And Rhythm] : heart rate was normal and rhythm regular [Heart Sounds] : normal S1 and S2 [Abdomen Soft] : soft [Abdomen Tenderness] : non-tender [Cervical Lymph Nodes Enlarged Anterior Bilaterally] : anterior cervical [Supraclavicular Lymph Nodes Enlarged Bilaterally] : supraclavicular [No CVA Tenderness] : no ~M costovertebral angle tenderness [Motor Tone] : muscle strength and tone were normal [] : no rash [No Focal Deficits] : no focal deficits [Impaired Insight] : insight and judgment were intact [Mood] : the mood was normal [FreeTextEntry1] : mild tenderness in b/l shoulders w/ good ROM; able to stand up w/o using his hands

## 2023-03-09 NOTE — HISTORY OF PRESENT ILLNESS
[FreeTextEntry1] : 68-year-old male here follow up states he started feeling unwell few months ago. States he had about a 10 pound unintentional weight loss since then. Patient states he noted pain in his groin/ pelvic area with myalgias in that area. He states he saw Vascular w/ work up ok there. He states then the pain progressed to his bilateral shoulders where he would notice soreness and stiffness proximally to the extent that it made him cry.\par Patient states he went to [facility] where he was evaluated for back pain and told he had herniated discs on MRI.\par States he then was sent to pain management where he was told he does not need an injection and likely has polymyalgia rheumatica.\par Patient states he was given a Medrol Dose pack that gave him prompt response to his pain and symptoms in the bilateral shoulders and pelvic area.\par \par Today on tapered down prednisone 5 mg daily x 3 weeks he has good ROM in b/l shoulders with normal ESR/CRP on labs now. \par States he feels mild tenderness in the shoulders rated 3/10 for severity with rotation from the OA and would like steroid injections that help. \par No pelvic/girdle stiffness and able to stand up without using his hands.\par No temporal pain/unremitting headaches, no vision changes, no jaw pain at any time.\par Today patient states as he is tapering off the prednisone down to prednisone 5 mg daily he notes pain in b/l hands daily rated 8/10 for severity in the b/l MCPs with morning stiffness of 3 hours to monitor. Will recheck RA labs. \par States he has been taking MTX 2.5 mg 6 tabs weekly as steroid sparing agent that he is tolerating well. \par States he did labs to review. \par States he can notice intermittent knee pain with kneeling though not much lately; and denies any crystal arthropathy like attacks.\par Denies any swelling or redness or warmth of any joints.\par Noted on labs to have +JA that was negative on recent labs.\par Denies any fever/chills, no rashes, no ulcers, no dry eyes, no dry mouth now, no raynaud's, no infectious diarrhea or  symptoms at this time.\par \par

## 2023-03-09 NOTE — REASON FOR VISIT
[Follow-Up: _____] : a [unfilled] follow-up visit [FreeTextEntry1] : PMR; pain in hands; review labs/meds; Rt shoulder pain; Lt shoulder pain

## 2023-03-09 NOTE — ASSESSMENT
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 \par States he had 10 lb unintentional wt loss & saw Heme/onc w/ workup ok there. \par \par Possible RA: \par -today patient states as he is tapering off the prednisone down to prednisone 5 mg daily he notes pain in b/l hands daily rated 8/10 for severity in the b/l MCPs with morning stiffness of 3 hours to monitor \par -will recheck RF/CCP \par -discussed to increase MTX 2.5 mg 6 tabs to 8 tabs weekly \par -refill folic acid daily \par \par PMR:  \par -today on prednisone 5 mg daily x 3 weeks he has Good ROM in b/l shoulders without tenderness; no pelvic/girdle stiffness and able to stand up without using his hands, no temporal pain/unremitting headaches, no vision changes, no jaw pain.\par -reviewed labs 2/27/23 with normal ESR/CRP; CBC w/ high WBC=13.26 (thinks he perhaps had a cold to monitor on repeat; monitor w/ his heme/onc if persistent); CMP ok; 1/24/23 w/ normal ESR; normal CRP; CBC ok; CMP ok; 12/27/22 w/ normal ESR; normal CRP now; JA negative; CBC w/ mildly high WBC=11.64 (denies any signs of infection; can be from prednisone; saw Heme/onc for it w/ workup ok); CMP w/ raised tot bili=1.4 (asymptomatic and told to monitor w/ PCP please) \par -discussed r/b/s of prednisone 5 mg daily x 1 week; then prednisone 4 mg daily x 4 weeks until follow up w/ pt agreeable and prescription sent as below\par -discussed to take Ca+vitD BID while on steroids\par -discussed r/b/s of MTX 2.5 mg 8 tabs PO weekly as discussed; to avoid too much alcohol w/ pt agreeable and prescription sent as below \par -refill sent for folic acid 1 mg daily as discussed \par -labs as below prior to f/u discussed \par -denies any GCA symptoms and knows to alert us if any concerns discussed \par -labs 12/5/2222 w/ high CRP=8 (from < 3 normal); normal ESR now; CBC/CMP ok; 11/3/22 urine prot/creat ratio=0.1 10/27/22 +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; DS-DNA neg; C3/C4 WNL\par -labs 8/3/22 w/ high CRP=20; normal ESR then; CBC/CMP ok; hepatitis negative; +JA 1:320 speckled; DS-DNA neg; C3/C4 WNL; urine prot/creat ratio WNL \par -labs 7/22/22 w/ high ESR=45; CPK normal=60; normal TSH; normal T4; +JA 1:320 homogenous; RF negative; high WBC=11.77 & high ihne=345 (can be reactive to f/u w/ heme/onc also); CMP ok; UA micro w/ calcium oxylate crystals (handout given on foods to avoid)\par -No synovitis or effusion on exam noted today and advised to monitor.\par \par OA shoulders: xray b/l shoulders 8/5/22 w/ b/l OA shoulders; Rt shoulder calcific tendonitis \par Rt shoulder pain: offered Kenalog 40 mg injection today w/ r/b/s discussed and patient agreeable.\par Lt shoulder pain: offered Kenalog 40 mg injection today w/ r/b/s discussed and patient agreeable.\par -Has good ROM in b/l shoulders, no pelvic/girdle stiffness and able to stand up without using her hands, no temporal pain/unremitting headaches, no vision changes, no jaw pain.\par \par Hip OA: no pain today \par -xray b/l hips 8/5/22 Rt>Lt hip OA and denies any groin/hip pain at this time\par \par +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: repeat JA Negative on labs 12/27/22\par -Clinically without much symptoms of lupus at this time and educated on symptoms to monitor for in detail if he evolves.\par -reviewed labs w/ 12/27/22 w/ disease activity markers normal w/ JA negative; DS-DNA neg; C3/C4 WNL; urine prot/creat ratio=0.1 \par -thyroid abs Negative; checked as +JA can be seen with cross reactivity\par \par Knee OA: no pain today \par -xray b/l kees 8/5/22 w/ b/l OA\par -voltaren gel 1% PRN \par -consider steroid injections if pain \par \par -educated on symptoms to monitor for in detail and alert us if any concerns.\par -knows to stay up to date on health maintenance w/ PCP\par -f/u in 4-5 weeks w/ labs please\par \par

## 2023-03-09 NOTE — PROCEDURE
[Today's Date:] : Date: [unfilled] [Risks] : risks [Benefits] : benefits [Alternatives] : alternatives [Consent Obtained] : written consent was obtained prior to the procedure and is detailed in the patient's record [Patient] : Prior to the start of the procedure a time out was taken and the identity of the patient was confirmed via name and date of birth with the patient. The correct site and the procedure to be performed were confirmed. The correct side was confirmed if applicable. The availability of the correct equipment was verified [Therapeutic] : therapeutic [#1 Site: ______] : #1 site identified in the [unfilled] [Ethyl Chloride] : ethyl chloride [Betadine] : betadine solution [Alcohol] : alcohol [22 gauge 1.5 inch] : A 22 gauge 1.5 inch needle was used [___ml 1% Lidocaine] : [unfilled] ml of 1% lidocaine [Tolerated Well] : the patient tolerated the procedure well [No Complications] : there were no complications [#2 Site: ___] : # 2 site identified in the [unfilled] [FreeTextEntry7] : x2 [FreeTextEntry5] : x2 [de-identified] : Kenalog 40 mg x 2

## 2023-03-27 ENCOUNTER — LABORATORY RESULT (OUTPATIENT)
Age: 68
End: 2023-03-27

## 2023-04-06 ENCOUNTER — APPOINTMENT (OUTPATIENT)
Dept: RHEUMATOLOGY | Facility: CLINIC | Age: 68
End: 2023-04-06
Payer: COMMERCIAL

## 2023-04-06 VITALS
WEIGHT: 200 LBS | BODY MASS INDEX: 25 KG/M2 | TEMPERATURE: 98 F | OXYGEN SATURATION: 97 % | SYSTOLIC BLOOD PRESSURE: 120 MMHG | HEART RATE: 67 BPM | DIASTOLIC BLOOD PRESSURE: 72 MMHG

## 2023-04-06 DIAGNOSIS — D72.829 ELEVATED WHITE BLOOD CELL COUNT, UNSPECIFIED: ICD-10-CM

## 2023-04-06 PROCEDURE — 99214 OFFICE O/P EST MOD 30 MIN: CPT

## 2023-04-06 RX ORDER — PREDNISONE 5 MG/1
5 TABLET ORAL
Qty: 30 | Refills: 0 | Status: DISCONTINUED | COMMUNITY
Start: 2022-08-03 | End: 2023-04-06

## 2023-04-06 NOTE — ASSESSMENT
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 \par States he had 10 lb unintentional wt loss & saw Heme/onc w/ workup ok there. \par Patient states as he was tapering off the prednisone down to prednisone 5 mg daily he notes pain in b/l hands daily rated 8/10 for severity in the b/l MCPs with morning stiffness of 3 hours that is better w/ increasing MTX to 8 tabs weekly and will consider possible seronegative RA conversion if unable to get him off the MTX in the future.\par -No synovitis or effusion on exam noted today and advised to monitor on meds\par \par PMR: \par -today on prednisone 4 mg daily x 3 weeks he has Good ROM in b/l shoulders without tenderness; no pelvic/girdle stiffness and able to stand up without using his hands, no temporal pain/unremitting headaches, no vision changes, no jaw pain.\par -reviewed labs 3/27/23 w/ high CRP= 8 (from 4; to monitor on repeat); ESR normal; CBC w/ decreasing high WBC=10.53 (from 13.26; monitor w/ his heme/onc if persistent); CMP ok; RF/CCP Negative; 2/27/23 with normal ESR/CRP; CBC w/ high WBC=13.26 (thinks he perhaps had a cold to monitor on repeat; monitor w/ his heme/onc if persistent); CMP ok; 1/24/23 w/ normal ESR; normal CRP; CBC ok; CMP ok; 12/27/22 w/ normal ESR; normal CRP now; JA negative; CBC w/ mildly high WBC=11.64 (denies any signs of infection; can be from prednisone; saw Heme/onc for it w/ workup ok); CMP w/ raised tot bili=1.4 (asymptomatic and told to monitor w/ PCP please) \par -discussed r/b/s of prednisone 4 mg daily x 1 week; then prednisone 3 mg daily x 4 weeks until follow up w/ pt agreeable and prescription sent as below\par -discussed to take Ca+vitD BID while on steroids\par -discussed r/b/s of MTX 2.5 mg 8 tabs PO weekly as discussed; to avoid too much alcohol w/ pt agreeable and prescription sent as below \par -refill sent for folic acid 1 mg daily as discussed \par -labs as below prior to f/u discussed \par -denies any GCA symptoms and knows to alert us if any concerns discussed \par -labs 12/5/2222 w/ high CRP=8 (from < 3 normal); normal ESR now; CBC/CMP ok; 11/3/22 urine prot/creat ratio=0.1 10/27/22 +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; DS-DNA neg; C3/C4 WNL\par -labs 8/3/22 w/ high CRP=20; normal ESR then; CBC/CMP ok; hepatitis negative; +JA 1:320 speckled; DS-DNA neg; C3/C4 WNL; urine prot/creat ratio WNL \par -labs 7/22/22 w/ high ESR=45; CPK normal=60; normal TSH; normal T4; +JA 1:320 homogenous; RF negative; high WBC=11.77 & high jfxg=128 (can be reactive to f/u w/ heme/onc also); CMP ok; UA micro w/ calcium oxylate crystals (handout given on foods to avoid)\par -No synovitis or effusion on exam noted today and advised to monitor.\par \par Raised CRP:\par -denies any signs of infection; no synovitis on meds today; PMR controlled on meds today; denies any wt loss or night sweats and knows to stay up to date on health maintenance w/ PCP\par -will monitor on repeat\par \par OA shoulders: no pain today \par -xray b/l shoulders 8/5/22 w/ b/l OA shoulders; Rt shoulder calcific tendonitis.\par -Has good ROM in b/l shoulders, no pelvic/girdle stiffness and able to stand up without using her hands, no temporal pain/unremitting headaches, no vision changes, no jaw pain.\par \par Hip OA: no pain today \par -xray b/l hips 8/5/22 Rt>Lt hip OA and denies any groin/hip pain at this time\par \par +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: repeat JA Negative on labs 12/27/22\par -Clinically without much symptoms of lupus at this time and educated on symptoms to monitor for in detail if he evolves.\par -reviewed labs w/ 12/27/22 w/ disease activity markers normal w/ JA negative; DS-DNA neg; C3/C4 WNL; urine prot/creat ratio=0.1 \par -thyroid abs Negative; checked as +JA can be seen with cross reactivity\par \par Knee OA: no pain today \par -xray b/l kees 8/5/22 w/ b/l OA\par -voltaren gel 1% PRN \par -consider steroid injections if pain \par \par -educated on symptoms to monitor for in detail and alert us if any concerns.\par -knows to stay up to date on health maintenance w/ PCP\par -f/u in 4-5 weeks w/ labs please\par

## 2023-04-06 NOTE — HISTORY OF PRESENT ILLNESS
[FreeTextEntry1] : 68-year-old male here follow up states he started feeling unwell few months ago. States he had about a 10 pound unintentional weight loss since then. Patient states he noted pain in his groin/ pelvic area with myalgias in that area. He states he saw [facility] w/ work up ok there. He states then the pain progressed to his bilateral shoulders where he would notice soreness and stiffness proximally to the extent that it made him cry.\par Patient states he went to [facility] where he was evaluated for back pain and told he had herniated discs on MRI.\par States he then was sent to pain management where he was told he does not need an injection and likely has polymyalgia rheumatica.\par Patient states he was given a Medrol Dose pack that gave him prompt response to his pain and symptoms in the bilateral shoulders and pelvic area.\par \par Today on tapered down prednisone 4 mg daily x 3 weeks whille on MTX and doing better.\par States no pain in the shoulders w/ good ROM today.\par No pelvic/girdle stiffness and able to stand up without using his hands.\par No temporal pain/unremitting headaches, no vision changes, no jaw pain at any time.\par States the joint pain in the hands/MCPs is resolved on the high dose of MTX 8 tabs weekly at this time.\par States he did labs to review. \par States he can notice intermittent knee pain with kneeling though not much lately; and denies any crystal arthropathy like attacks.\par Denies any swelling or redness or warmth of any joints today.\par Noted on labs to have +JA that was negative on recent labs.\par Denies any fever/chills, no rashes, no ulcers, no dry eyes, no dry mouth now, no raynaud's, no infectious diarrhea or  symptoms at this time.\par \par

## 2023-04-25 ENCOUNTER — LABORATORY RESULT (OUTPATIENT)
Age: 68
End: 2023-04-25

## 2023-05-04 ENCOUNTER — APPOINTMENT (OUTPATIENT)
Dept: RHEUMATOLOGY | Facility: CLINIC | Age: 68
End: 2023-05-04
Payer: COMMERCIAL

## 2023-05-04 VITALS
OXYGEN SATURATION: 99 % | HEART RATE: 69 BPM | DIASTOLIC BLOOD PRESSURE: 72 MMHG | BODY MASS INDEX: 25 KG/M2 | SYSTOLIC BLOOD PRESSURE: 122 MMHG | WEIGHT: 200 LBS | TEMPERATURE: 98 F

## 2023-05-04 DIAGNOSIS — M35.3 POLYMYALGIA RHEUMATICA: ICD-10-CM

## 2023-05-04 PROCEDURE — 99214 OFFICE O/P EST MOD 30 MIN: CPT

## 2023-05-04 NOTE — ASSESSMENT
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 \par States he had 10 lb unintentional wt loss & saw Heme/onc w/ workup ok there. \par \par Hand Arthropathy:\par Patient states as he was tapering off the prednisone down to prednisone 3 mg daily he notes pain in b/l hands daily rated 8/10 for severity in the b/l MCPs with morning stiffness of 3 hours that is helped by the MTX to 8 tabs weekly but still notes it in the AM with trouble making a fist in the AM and will check Vectra which if not low will then add TNF-I for seronegative RA.\par -will check Vectra and screening labs for biologic \par -continue MTX 8 tabs weekly that helps\par -continue folic acid daily\par \par PMR: \par -today on prednisone 3 mg daily x 3 weeks he has Good ROM in b/l shoulders without tenderness; no pelvic/girdle stiffness and able to stand up without using his hands, no temporal pain/unremitting headaches, no vision changes, no jaw pain.\par -reviewed labs 4/25/23 high CRP=11 (8); ESR normal; CBC ok; 3/27/23 w/ high CRP= 8 (from 4; to monitor on repeat); ESR normal; CBC w/ decreasing high WBC=10.53 (from 13.26; monitor w/ his heme/onc if persistent); CMP ok; RF/CCP Negative; 2/27/23 with normal ESR/CRP; CBC w/ high WBC=13.26 (thinks he perhaps had a cold to monitor on repeat; monitor w/ his heme/onc if persistent); CMP ok; 1/24/23 w/ normal ESR; normal CRP; CBC ok; CMP ok; 12/27/22 w/ normal ESR; normal CRP now; JA negative; CBC w/ mildly high WBC=11.64 (denies any signs of infection; can be from prednisone; saw Heme/onc for it w/ workup ok); CMP w/ raised tot bili=1.4 (asymptomatic and told to monitor w/ PCP please) \par -discussed r/b/s of prednisone 3 mg daily x 1 week; then prednisone 2 mg daily x 4 weeks until follow up w/ pt agreeable and prescription sent as below\par -discussed to take Ca+vitD BID while on steroids\par -discussed r/b/s of MTX 2.5 mg 8 tabs PO weekly as discussed; to avoid too much alcohol w/ pt agreeable and prescription sent as below \par -refill sent for folic acid 1 mg daily as discussed \par -labs as below prior to f/u discussed \par -denies any GCA symptoms and knows to alert us if any concerns discussed \par -labs 12/5/2222 w/ high CRP=8 (from < 3 normal); normal ESR now; CBC/CMP ok; 11/3/22 urine prot/creat ratio=0.1 10/27/22 +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; DS-DNA neg; C3/C4 WNL\par -labs 8/3/22 w/ high CRP=20; normal ESR then; CBC/CMP ok; hepatitis negative; +JA 1:320 speckled; DS-DNA neg; C3/C4 WNL; urine prot/creat ratio WNL \par -labs 7/22/22 w/ high ESR=45; CPK normal=60; normal TSH; normal T4; +JA 1:320 homogenous; RF negative; high WBC=11.77 & high bfpr=460 (can be reactive to f/u w/ heme/onc also); CMP ok; UA micro w/ calcium oxylate crystals (handout given on foods to avoid)\par -No synovitis or effusion on exam noted today and advised to monitor.\par \par OA shoulders: no pain today \par -xray b/l shoulders 8/5/22 w/ b/l OA shoulders; Rt shoulder calcific tendonitis.\par -Has good ROM in b/l shoulders, no pelvic/girdle stiffness and able to stand up without using her hands, no temporal pain/unremitting headaches, no vision changes, no jaw pain.\par \par Hip OA: no pain today \par -xray b/l hips 8/5/22 Rt>Lt hip OA and denies any groin/hip pain at this time\par \par +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: repeat JA Negative on labs 12/27/22\par -Clinically without much symptoms of lupus at this time and educated on symptoms to monitor for in detail if he evolves.\par -reviewed labs w/ 12/27/22 w/ disease activity markers normal w/ JA negative; DS-DNA neg; C3/C4 WNL; urine prot/creat ratio=0.1 \par -thyroid abs Negative; checked as +JA can be seen with cross reactivity\par \par Knee OA: no pain today \par -xray b/l kees 8/5/22 w/ b/l OA\par -voltaren gel 1% PRN \par -consider steroid injections if pain \par \par -educated on symptoms to monitor for in detail and alert us if any concerns.\par -knows to stay up to date on health maintenance w/ PCP\par -f/u in 4-5 weeks w/ labs/Vectra please\par . \par \par

## 2023-05-04 NOTE — HISTORY OF PRESENT ILLNESS
[FreeTextEntry1] : 68-year-old male here follow up states he started feeling unwell few months ago. States he had about a 10 pound unintentional weight loss since then. Patient states he noted pain in his groin/ pelvic area with myalgias in that area. He states he saw Vascular w/ work up ok there. He states then the pain progressed to his bilateral shoulders where he would notice soreness and stiffness proximally to the extent that it made him cry.\par Patient states he went to [facility] where he was evaluated for back pain and told he had herniated discs on MRI.\par States he then was sent to pain management where he was told he does not need an injection and likely has polymyalgia rheumatica.\par Patient states he was given a Medrol Dose pack that gave him prompt response to his pain and symptoms in the bilateral shoulders and pelvic area.\par \par Today on tapered down prednisone 3 mg daily x 3 weeks whille on MTX and doing better.\par States no pain in the shoulders w/ good ROM today.\par No pelvic/girdle stiffness and able to stand up without using his hands.\par No temporal pain/unremitting headaches, no vision changes, no jaw pain at any time.\par States the joint pain in the hands/MCPs is worse in the AM and has trouble making a fist.\par States morning stiffness in the hands can be 3 hours.\par States he did labs to review. \par States he can notice intermittent knee pain with kneeling though not much lately; and denies any crystal arthropathy like attacks.\par Denies any swelling or redness or warmth of any joints today.\par Noted on labs to have +JA that was negative on recent labs.\par Denies any fever/chills, no rashes, no ulcers, no dry eyes, no dry mouth now, no raynaud's, no infectious diarrhea or  symptoms at this time.\par \par

## 2023-05-04 NOTE — REASON FOR VISIT
[Follow-Up: _____] : a [unfilled] follow-up visit [FreeTextEntry1] : PMR; pain in hands; review labs/meds\par \par  \par

## 2023-05-04 NOTE — PHYSICAL EXAM
[General Appearance - Alert] : alert [General Appearance - In No Acute Distress] : in no acute distress [Sclera] : the sclera and conjunctiva were normal [Extraocular Movements] : extraocular movements were intact [Outer Ear] : the ears and nose were normal in appearance [Neck Appearance] : the appearance of the neck was normal [Respiration, Rhythm And Depth] : normal respiratory rhythm and effort [Heart Rate And Rhythm] : heart rate was normal and rhythm regular [Heart Sounds] : normal S1 and S2 [Abdomen Soft] : soft [Abdomen Tenderness] : non-tender [Supraclavicular Lymph Nodes Enlarged Bilaterally] : supraclavicular [Cervical Lymph Nodes Enlarged Anterior Bilaterally] : anterior cervical [No CVA Tenderness] : no ~M costovertebral angle tenderness [Motor Tone] : muscle strength and tone were normal [] : no rash [No Focal Deficits] : no focal deficits [Impaired Insight] : insight and judgment were intact [Mood] : the mood was normal [FreeTextEntry1] : No synovitis or effusion on exam noted today; Good ROM in b/l shoulders, no pelvic/girdle stiffness and able to stand up without using his hands

## 2023-05-22 LAB
ALBUMIN SERPL ELPH-MCNC: 4.2 G/DL
ALP BLD-CCNC: 65 U/L
ALT SERPL-CCNC: 21 U/L
ANION GAP SERPL CALC-SCNC: 14 MMOL/L
AST SERPL-CCNC: 25 U/L
BILIRUB SERPL-MCNC: 0.6 MG/DL
BUN SERPL-MCNC: 26 MG/DL
CALCIUM SERPL-MCNC: 9.5 MG/DL
CHLORIDE SERPL-SCNC: 104 MMOL/L
CO2 SERPL-SCNC: 25 MMOL/L
CREAT SERPL-MCNC: 0.85 MG/DL
CRP SERPL-MCNC: 7 MG/L
EGFR: 95 ML/MIN/1.73M2
GLUCOSE SERPL-MCNC: 107 MG/DL
POTASSIUM SERPL-SCNC: 4.8 MMOL/L
PROT SERPL-MCNC: 6.4 G/DL
SODIUM SERPL-SCNC: 143 MMOL/L

## 2023-05-23 LAB
ERYTHROCYTE [SEDIMENTATION RATE] IN BLOOD BY WESTERGREN METHOD: < 2 MM/HR
HAV IGM SER QL: NONREACTIVE
HBV CORE IGM SER QL: NONREACTIVE
HBV SURFACE AG SER QL: NONREACTIVE
HCV AB SER QL: NONREACTIVE
HCV S/CO RATIO: 0.08 S/CO
HIV1+2 AB SPEC QL IA.RAPID: NONREACTIVE

## 2023-05-25 LAB
M TB IFN-G BLD-IMP: ABNORMAL
QUANTIFERON TB PLUS MITOGEN MINUS NIL: 0.15 IU/ML
QUANTIFERON TB PLUS NIL: 0.02 IU/ML
QUANTIFERON TB PLUS TB1 MINUS NIL: 0 IU/ML
QUANTIFERON TB PLUS TB2 MINUS NIL: 0 IU/ML

## 2023-05-31 ENCOUNTER — RX RENEWAL (OUTPATIENT)
Age: 68
End: 2023-05-31

## 2023-05-31 LAB
AA PROT SER-MCNC: 5 UG/ML
BIOMARKER COMMENT: NORMAL
CRP SERPL-MCNC: 6.9 MG/L
EGF SERPL-MCNC: 230 PG/ML
FOOTNOTE: NORMAL
IL6 SERPL-MCNC: 21 PG/ML
LEPTIN SERPL-MCNC: 12 NG/ML
Lab: NORMAL
Lab: NORMAL
MMP-1 SERPL-MCNC: 8.9 NG/ML
MMP-3 SERPL-MCNC: 64 NG/ML
RA DAS LEVEL QL VECTRADA: NORMAL
RESISTIN SERPL-MCNC: 3.4 NG/ML
RISK OF RADIOGRAPHIC PROGRESS: 6 %
TNFRSF1A SERPL-MCNC: 1.3 NG/ML
VAP-1 SERPL-MCNC: 0.71 UG/ML
VECTRA SCORE: 41
VEGF-A SERPL-MCNC: 330 PG/ML
YKL-40 RESULT: 64 NG/ML

## 2023-06-05 LAB — G6PD SER-CCNC: 15.4 U/G HGB

## 2023-06-07 ENCOUNTER — APPOINTMENT (OUTPATIENT)
Dept: RHEUMATOLOGY | Facility: CLINIC | Age: 68
End: 2023-06-07
Payer: COMMERCIAL

## 2023-06-07 VITALS
SYSTOLIC BLOOD PRESSURE: 156 MMHG | HEART RATE: 70 BPM | DIASTOLIC BLOOD PRESSURE: 86 MMHG | TEMPERATURE: 98.2 F | BODY MASS INDEX: 24.12 KG/M2 | OXYGEN SATURATION: 100 % | HEIGHT: 75 IN | WEIGHT: 194 LBS

## 2023-06-07 PROCEDURE — 99214 OFFICE O/P EST MOD 30 MIN: CPT

## 2023-06-07 RX ORDER — METHOTREXATE 2.5 MG/1
2.5 TABLET ORAL
Qty: 40 | Refills: 1 | Status: DISCONTINUED | COMMUNITY
Start: 2023-05-31 | End: 2023-06-07

## 2023-06-07 NOTE — HISTORY OF PRESENT ILLNESS
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 \par States he had 10 lb unintentional wt loss & saw Heme/onc w/ workup ok there. 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 \par He has achy pain in the Rt wrist without effusion.\par States morning stiffness on & off all day in the hands and wrists.\par States he did labs to review in detail.\par States no pain in the shoulders w/ good ROM today.\par No pelvic/girdle stiffness and able to stand up without using his hands.\par No temporal pain/unremitting headaches, no vision changes, no jaw pain at any time.\par States he can notice intermittent knee pain with kneeling though not much lately; and denies any crystal arthropathy like attacks.\par Noted on labs to have +JA that was negative on recent labs.\par Denies any fever/chills, no rashes, no ulcers, no dry eyes, no dry mouth now, no raynaud's, no infectious diarrhea or  symptoms at this time.\par

## 2023-06-07 NOTE — REASON FOR VISIT
[Follow-Up: _____] : a [unfilled] follow-up visit [FreeTextEntry1] : Seronegative RA; joint pain in the hands and wrist; review labs/meds\par

## 2023-06-07 NOTE — ASSESSMENT
[FreeTextEntry1] : 68-year-old male, here for follow up w/ OA hands; with PMR transitioned to Seronegative RA w/ Vectra=41 Moderate disease activity w/ high CRP=7 with pain and swelling in b/l hands/MCPs/PIPs and Rt wrist at this time.\par \par Seronegative RA:\par -today has tenderness in Rt hand 2nd, 4th MCPs, Lt hand 3rd and 4th MCPs, Rt wrist tenderness w/o effusion \par -reviewed labs 5/22/23 w/ Vectra=41 Moderate disease activity; high CRP=7 (from 11); quantiferon gold indeterminate (told to repeat); ESR normal; CMP ok; hepatitis negative; HIV negative; G6PD normal; 4/25/23 CBC normal \par -he defers self injections \par -discussed r/b/s of adding Sulfasalazine w/ no hx of allergy to it w/ prescription sent for sulfasalazine 500 mg PO 1 tab PO daily x 1 week then if tolerating well increase to sulfasalazine 500 mg 1 tab PO BID\par -labs as below requested to monitor on it for follow up\par -discussed r/b/s of refilling MTX 2.5 mg 8 tabs PO weekly w/ pt agreeable and prescription sent as below\par -discussed r/b/s of refilling folic acid 1 mg daily w/ pt agreeable and prescription sent as below\par -discussed r/b/s of prednisone 10 mg w/ taper off w/ pt agreeable and prescription sent as below\par \par PMR: in remission now\par -today has Good ROM in b/l shoulders without tenderness; no pelvic/girdle stiffness and able to stand up without using his hands, no temporal pain/unremitting headaches, no vision changes, no jaw pain.\par -denies any GCA symptoms and knows to alert us if any concerns discussed \par \par OA shoulders: no pain today \par -xray b/l shoulders 8/5/22 w/ b/l OA shoulders; Rt shoulder calcific tendonitis.\par -Has good ROM in b/l shoulders, no pelvic/girdle stiffness and able to stand up without using her hands, no temporal pain/unremitting headaches, no vision changes, no jaw pain.\par \par Hip OA: no pain today \par -xray b/l hips 8/5/22 Rt>Lt hip OA and denies any groin/hip pain at this time\par \par +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: repeat JA Negative on labs 12/27/22\par -Clinically without much symptoms of lupus at this time and educated on symptoms to monitor for in detail if he evolves.\par -reviewed labs w/ 12/27/22 w/ disease activity markers normal w/ JA negative; DS-DNA neg; C3/C4 WNL; urine prot/creat ratio=0.1 \par -thyroid abs Negative; checked as +JA can be seen with cross reactivity\par \par Knee OA: no pain today \par -xray b/l kees 8/5/22 w/ b/l OA\par -voltaren gel 1% PRN \par -consider steroid injections if pain \par \par -educated on symptoms to monitor for in detail and alert us if any concerns.\par -knows to stay up to date on health maintenance w/ PCP\par -f/u in 6-8 weeks w/ labs on Sulfasalazine or sooner as needed \par

## 2023-06-07 NOTE — PHYSICAL EXAM
[General Appearance - Alert] : alert [General Appearance - In No Acute Distress] : in no acute distress [Sclera] : the sclera and conjunctiva were normal [Extraocular Movements] : extraocular movements were intact [Outer Ear] : the ears and nose were normal in appearance [Neck Appearance] : the appearance of the neck was normal [Respiration, Rhythm And Depth] : normal respiratory rhythm and effort [Heart Rate And Rhythm] : heart rate was normal and rhythm regular [Heart Sounds] : normal S1 and S2 [Abdomen Soft] : soft [Abdomen Tenderness] : non-tender [Cervical Lymph Nodes Enlarged Anterior Bilaterally] : anterior cervical [Supraclavicular Lymph Nodes Enlarged Bilaterally] : supraclavicular [No CVA Tenderness] : no ~M costovertebral angle tenderness [Motor Tone] : muscle strength and tone were normal [] : no rash [No Focal Deficits] : no focal deficits [Impaired Insight] : insight and judgment were intact [Mood] : the mood was normal [FreeTextEntry1] : tenderness in Rt hand 2nd, 4th MCPs, Lt hand 3rd and 4th MCPs, Rt wrist tenderness w/o effusion; Good ROM in b/l shoulders, no pelvic/girdle stiffness and able to stand up without using his hands

## 2023-06-26 ENCOUNTER — RX RENEWAL (OUTPATIENT)
Age: 68
End: 2023-06-26

## 2023-07-05 LAB
ALBUMIN SERPL ELPH-MCNC: 4.1 G/DL
ALP BLD-CCNC: 62 U/L
ALT SERPL-CCNC: 19 U/L
ANION GAP SERPL CALC-SCNC: 11 MMOL/L
APPEARANCE: CLEAR
AST SERPL-CCNC: 20 U/L
BASOPHILS # BLD AUTO: 0.08 K/UL
BASOPHILS NFR BLD AUTO: 0.9 %
BILIRUB SERPL-MCNC: 0.9 MG/DL
BILIRUBIN URINE: NEGATIVE
BLOOD URINE: NEGATIVE
BUN SERPL-MCNC: 20 MG/DL
CALCIUM SERPL-MCNC: 9.2 MG/DL
CHLORIDE SERPL-SCNC: 104 MMOL/L
CO2 SERPL-SCNC: 26 MMOL/L
COLOR: NORMAL
CREAT SERPL-MCNC: 0.96 MG/DL
CRP SERPL-MCNC: 13 MG/L
EGFR: 86 ML/MIN/1.73M2
EOSINOPHIL # BLD AUTO: 0.29 K/UL
EOSINOPHIL NFR BLD AUTO: 3.1 %
GLUCOSE QUALITATIVE U: NEGATIVE MG/DL
GLUCOSE SERPL-MCNC: 97 MG/DL
HCT VFR BLD CALC: 48.9 %
HGB BLD-MCNC: 15.2 G/DL
IMM GRANULOCYTES NFR BLD AUTO: 0.6 %
KETONES URINE: NEGATIVE MG/DL
LEUKOCYTE ESTERASE URINE: NEGATIVE
LYMPHOCYTES # BLD AUTO: 0.88 K/UL
LYMPHOCYTES NFR BLD AUTO: 9.5 %
MAN DIFF?: NORMAL
MCHC RBC-ENTMCNC: 29.2 PG
MCHC RBC-ENTMCNC: 31.1 GM/DL
MCV RBC AUTO: 93.9 FL
MONOCYTES # BLD AUTO: 0.84 K/UL
MONOCYTES NFR BLD AUTO: 9.1 %
NEUTROPHILS # BLD AUTO: 7.13 K/UL
NEUTROPHILS NFR BLD AUTO: 76.8 %
NITRITE URINE: NEGATIVE
PH URINE: 7
PLATELET # BLD AUTO: 267 K/UL
POTASSIUM SERPL-SCNC: 4.6 MMOL/L
PROT SERPL-MCNC: 6.2 G/DL
PROTEIN URINE: NEGATIVE MG/DL
RBC # BLD: 5.21 M/UL
RBC # FLD: 13.2 %
SODIUM SERPL-SCNC: 140 MMOL/L
SPECIFIC GRAVITY URINE: 1.02
UROBILINOGEN URINE: 0.2 MG/DL
WBC # FLD AUTO: 9.28 K/UL

## 2023-07-06 LAB — ERYTHROCYTE [SEDIMENTATION RATE] IN BLOOD BY WESTERGREN METHOD: 17 MM/HR

## 2023-07-10 LAB
M TB IFN-G BLD-IMP: NEGATIVE
QUANTIFERON TB PLUS MITOGEN MINUS NIL: 0.66 IU/ML
QUANTIFERON TB PLUS NIL: 0.02 IU/ML
QUANTIFERON TB PLUS TB1 MINUS NIL: 0 IU/ML
QUANTIFERON TB PLUS TB2 MINUS NIL: -0.01 IU/ML

## 2023-07-24 ENCOUNTER — APPOINTMENT (OUTPATIENT)
Dept: RHEUMATOLOGY | Facility: CLINIC | Age: 68
End: 2023-07-24
Payer: COMMERCIAL

## 2023-07-24 VITALS
HEART RATE: 69 BPM | OXYGEN SATURATION: 94 % | DIASTOLIC BLOOD PRESSURE: 70 MMHG | WEIGHT: 200 LBS | TEMPERATURE: 98 F | BODY MASS INDEX: 35.44 KG/M2 | SYSTOLIC BLOOD PRESSURE: 130 MMHG | HEIGHT: 63 IN

## 2023-07-24 DIAGNOSIS — M19.049 PRIMARY OSTEOARTHRITIS, UNSPECIFIED HAND: ICD-10-CM

## 2023-07-24 DIAGNOSIS — R79.82 ELEVATED C-REACTIVE PROTEIN (CRP): ICD-10-CM

## 2023-07-24 DIAGNOSIS — M19.019 PRIMARY OSTEOARTHRITIS, UNSPECIFIED SHOULDER: ICD-10-CM

## 2023-07-24 DIAGNOSIS — M17.9 OSTEOARTHRITIS OF KNEE, UNSPECIFIED: ICD-10-CM

## 2023-07-24 DIAGNOSIS — M75.31 CALCIFIC TENDINITIS OF RIGHT SHOULDER: ICD-10-CM

## 2023-07-24 DIAGNOSIS — M16.9 OSTEOARTHRITIS OF HIP, UNSPECIFIED: ICD-10-CM

## 2023-07-24 DIAGNOSIS — M06.00 RHEUMATOID ARTHRITIS W/OUT RHEUMATOID FACTOR, UNSPECIFIED SITE: ICD-10-CM

## 2023-07-24 DIAGNOSIS — M25.539 PAIN IN UNSPECIFIED WRIST: ICD-10-CM

## 2023-07-24 PROCEDURE — 99214 OFFICE O/P EST MOD 30 MIN: CPT

## 2023-07-24 RX ORDER — SILDENAFIL 100 MG/1
100 TABLET, FILM COATED ORAL
Qty: 30 | Refills: 0 | Status: ACTIVE | COMMUNITY
Start: 2022-07-11

## 2023-07-24 RX ORDER — PREDNISONE 5 MG/1
5 TABLET ORAL
Qty: 35 | Refills: 0 | Status: ACTIVE | COMMUNITY
Start: 2022-09-29 | End: 1900-01-01

## 2023-07-24 RX ORDER — SULFASALAZINE 500 MG/1
500 TABLET ORAL
Qty: 60 | Refills: 1 | Status: DISCONTINUED | COMMUNITY
Start: 2023-06-07 | End: 2023-07-24

## 2023-07-24 RX ADMIN — INFLIXIMAB 0 MG: 100 INJECTION, POWDER, LYOPHILIZED, FOR SOLUTION INTRAVENOUS at 00:00

## 2023-07-24 NOTE — ASSESSMENT
[FreeTextEntry1] : 68-year-old male, here for follow up w/ OA hands; with PMR transitioned to Seronegative RA w/ Vectra=41 Moderate disease activity w/ high CRP=7 with pain and swelling in b/l hands/MCPs/PIPs and Rt wrist at this time.\par \par Seronegative RA:\par -today has tenderness in Rt hand 2-5 MCPs w/ swelling; tenderness in Lt hand 3-5 MCPs and 5th PIP with swelling, Rt wrist tenderness w/o effusion \par -he states Sulfasalazine 1 gm not helping and would like stronger medication \par -reviewed labs 7/5/23 w/ high CRP=13 (from 7); ESR normal; CBC ok; CMP ok; Quantiferon gold negative; UA micro no blood on sulfasalazine; 5/22/23 w/ Vectra=41 Moderate disease activity; high CRP=7 (from 11); quantiferon gold indeterminate (told to repeat); ESR normal; CMP ok; hepatitis negative; HIV negative; G6PD normal; 4/25/23 CBC normal \par -he defers self injections \par -discussed r/b/s of IV Remicade 3 mg/kg at 300 mg q 0, 2, 6 and then q 8 weeks incld risk of infections, malignancy w/ regular skin checks w/ his Derm; and reactivation of TB with patient agreeable with consent given to RN and reading material provided and put in for prior auth\par -labs as below requested to monitor on it for follow up\par -discussed r/b/s of refilling MTX 2.5 mg 8 tabs PO weekly w/ pt agreeable and prescription sent as below\par -discussed r/b/s of refilling folic acid 1 mg daily w/ pt agreeable and prescription sent as below\par -discussed r/b/s of prednisone 10 mg w/ taper off w/ pt agreeable and prescription sent as below\par \par PMR: in remission now\par -today has Good ROM in b/l shoulders without tenderness; no pelvic/girdle stiffness and able to stand up without using his hands, no temporal pain/unremitting headaches, no vision changes, no jaw pain.\par -denies any GCA symptoms and knows to alert us if any concerns discussed \par \par OA shoulders: no pain today \par -xray b/l shoulders 8/5/22 w/ b/l OA shoulders; Rt shoulder calcific tendonitis.\par -Has good ROM in b/l shoulders, no pelvic/girdle stiffness and able to stand up without using her hands, no temporal pain/unremitting headaches, no vision changes, no jaw pain.\par \par Hip OA: no pain today \par -xray b/l hips 8/5/22 Rt>Lt hip OA and denies any groin/hip pain at this time\par \par +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: repeat JA Negative on labs 12/27/22\par -Clinically without much symptoms of lupus at this time and educated on symptoms to monitor for in detail if he evolves.\par -reviewed labs w/ 12/27/22 w/ disease activity markers normal w/ JA negative; DS-DNA neg; C3/C4 WNL; urine prot/creat ratio=0.1 \par -thyroid abs Negative; checked as +JA can be seen with cross reactivity\par \par Knee OA: no pain today \par -xray b/l kees 8/5/22 w/ b/l OA\par -voltaren gel 1% PRN \par -consider steroid injections if pain \par \par -educated on symptoms to monitor for in detail and alert us if any concerns.\par -knows to stay up to date on health maintenance w/ PCP\par -f/u in 2-3 mo w/ labs or sooner as needed \par . \par

## 2023-07-24 NOTE — HISTORY OF PRESENT ILLNESS
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 \par States he had 10 lb unintentional wt loss & saw Heme/onc w/ workup ok there. \par He has now transitioned to Seronegative RA w/ Vectra=41 Moderate disease activity w/ high CRP=7 with pain and swelling in b/l hands/MCPs/PIPs and Rt wrist at this time.\par \par Today he states he is taking the Sulfasalazine 1 gm while on MTX 20 mg weekly.\par He states the sulfasalazine is not helping much with joint pain and would like stronger medication to help.\par Today he has achy pain in the b/l hands/MCPs/PIPs. \par He has achy pain in the Rt wrist without effusion.\par States morning stiffness on & off all day in the hands and wrists.\par States he did labs to review in detail.\par States no pain in the shoulders w/ good ROM today.\par No pelvic/girdle stiffness and able to stand up without using his hands.\par No temporal pain/unremitting headaches, no vision changes, no jaw pain at any time.\par States he can notice intermittent knee pain with kneeling though not much lately; and denies any crystal arthropathy like attacks.\par Noted on labs to have +JA that was negative on recent labs.\par Denies any fever/chills, no rashes, no ulcers, no dry eyes, no dry mouth now, no raynaud's, no infectious diarrhea or  symptoms at this time.\par \par

## 2023-07-24 NOTE — REASON FOR VISIT
[Follow-Up: _____] : a [unfilled] follow-up visit [FreeTextEntry1] : Seronegative RA; joint pain in the hands and wrist; review labs/meds\par \par Seronegative RA; joint pain in the hands and wrist; review labs/meds\par

## 2023-07-24 NOTE — PHYSICAL EXAM
[General Appearance - Alert] : alert [General Appearance - In No Acute Distress] : in no acute distress [Sclera] : the sclera and conjunctiva were normal [Extraocular Movements] : extraocular movements were intact [Outer Ear] : the ears and nose were normal in appearance [Neck Appearance] : the appearance of the neck was normal [Respiration, Rhythm And Depth] : normal respiratory rhythm and effort [Heart Rate And Rhythm] : heart rate was normal and rhythm regular [Heart Sounds] : normal S1 and S2 [Abdomen Soft] : soft [Abdomen Tenderness] : non-tender [Cervical Lymph Nodes Enlarged Anterior Bilaterally] : anterior cervical [Supraclavicular Lymph Nodes Enlarged Bilaterally] : supraclavicular [No CVA Tenderness] : no ~M costovertebral angle tenderness [Motor Tone] : muscle strength and tone were normal [] : no rash [No Focal Deficits] : no focal deficits [Impaired Insight] : insight and judgment were intact [Mood] : the mood was normal [FreeTextEntry1] :  tenderness in Rt hand 2-5 MCPs w/ swelling; tenderness in Lt hand 3-5 MCPs and 5th PIP with swelling, Rt wrist tenderness w/o effusion

## 2023-07-27 RX ORDER — INFLIXIMAB 100 MG/10ML
100 INJECTION, POWDER, LYOPHILIZED, FOR SOLUTION INTRAVENOUS
Qty: 1 | Refills: 0 | Status: DISCONTINUED | OUTPATIENT
Start: 2023-07-24 | End: 2023-07-27

## 2023-08-02 RX ORDER — CETIRIZINE HYDROCHLORIDE 10 MG/1
10 TABLET, FILM COATED ORAL
Refills: 0 | Status: ACTIVE | OUTPATIENT
Start: 2023-08-02 | End: 1900-01-01

## 2023-08-02 RX ORDER — METHYLPREDNISOLONE 40 MG/ML
40 INJECTION, POWDER, LYOPHILIZED, FOR SOLUTION INTRAMUSCULAR; INTRAVENOUS
Refills: 0 | Status: ACTIVE | OUTPATIENT
Start: 2023-08-02 | End: 1900-01-01

## 2023-08-02 RX ORDER — ACETAMINOPHEN 325 MG/1
325 TABLET ORAL
Refills: 0 | Status: ACTIVE | OUTPATIENT
Start: 2023-08-02 | End: 1900-01-01

## 2023-09-11 ENCOUNTER — APPOINTMENT (OUTPATIENT)
Dept: RHEUMATOLOGY | Facility: CLINIC | Age: 68
End: 2023-09-11
Payer: COMMERCIAL

## 2023-09-11 VITALS
HEART RATE: 50 BPM | DIASTOLIC BLOOD PRESSURE: 83 MMHG | TEMPERATURE: 98 F | OXYGEN SATURATION: 96 % | SYSTOLIC BLOOD PRESSURE: 153 MMHG | RESPIRATION RATE: 17 BRPM

## 2023-09-11 PROCEDURE — 96365 THER/PROPH/DIAG IV INF INIT: CPT

## 2023-09-11 PROCEDURE — 96374 THER/PROPH/DIAG INJ IV PUSH: CPT | Mod: 59

## 2023-09-11 PROCEDURE — 96366 THER/PROPH/DIAG IV INF ADDON: CPT

## 2023-09-11 RX ORDER — INFLIXIMAB-DYYB 100 MG/10ML
100 INJECTION, POWDER, LYOPHILIZED, FOR SOLUTION INTRAVENOUS
Qty: 0 | Refills: 0 | Status: COMPLETED
Start: 2023-07-27

## 2023-09-11 RX ORDER — CETIRIZINE HYDROCHLORIDE 10 MG/1
10 TABLET, FILM COATED ORAL
Qty: 0 | Refills: 0 | Status: COMPLETED
Start: 2023-08-02

## 2023-09-11 RX ORDER — METHYLPREDNISOLONE 40 MG/ML
40 INJECTION, POWDER, LYOPHILIZED, FOR SOLUTION INTRAMUSCULAR; INTRAVENOUS
Qty: 1 | Refills: 0 | Status: COMPLETED
Start: 2023-08-02

## 2023-09-11 RX ORDER — ACETAMINOPHEN 325 MG/1
325 TABLET ORAL
Qty: 0 | Refills: 0 | Status: COMPLETED
Start: 2023-08-02

## 2023-09-26 ENCOUNTER — APPOINTMENT (OUTPATIENT)
Dept: RHEUMATOLOGY | Facility: CLINIC | Age: 68
End: 2023-09-26
Payer: COMMERCIAL

## 2023-09-26 VITALS
OXYGEN SATURATION: 99 % | HEART RATE: 55 BPM | SYSTOLIC BLOOD PRESSURE: 120 MMHG | TEMPERATURE: 96.8 F | RESPIRATION RATE: 17 BRPM | DIASTOLIC BLOOD PRESSURE: 74 MMHG

## 2023-09-26 VITALS
HEART RATE: 53 BPM | RESPIRATION RATE: 17 BRPM | OXYGEN SATURATION: 98 % | SYSTOLIC BLOOD PRESSURE: 139 MMHG | TEMPERATURE: 96.6 F | DIASTOLIC BLOOD PRESSURE: 84 MMHG

## 2023-09-26 PROCEDURE — 96374 THER/PROPH/DIAG INJ IV PUSH: CPT | Mod: 59

## 2023-09-26 PROCEDURE — 96365 THER/PROPH/DIAG IV INF INIT: CPT

## 2023-09-26 PROCEDURE — 96366 THER/PROPH/DIAG IV INF ADDON: CPT

## 2023-09-26 RX ORDER — METHYLPREDNISOLONE 40 MG/ML
40 INJECTION, POWDER, LYOPHILIZED, FOR SOLUTION INTRAMUSCULAR; INTRAVENOUS
Qty: 1 | Refills: 0 | Status: COMPLETED
Start: 2023-08-02

## 2023-09-26 RX ORDER — INFLIXIMAB-DYYB 100 MG/10ML
100 INJECTION, POWDER, LYOPHILIZED, FOR SOLUTION INTRAVENOUS
Qty: 0 | Refills: 0 | Status: COMPLETED
Start: 2023-07-27

## 2023-09-26 RX ORDER — CETIRIZINE HYDROCHLORIDE 10 MG/1
10 TABLET, FILM COATED ORAL
Qty: 0 | Refills: 0 | Status: COMPLETED
Start: 2023-08-02

## 2023-09-26 RX ORDER — ACETAMINOPHEN 325 MG/1
325 TABLET ORAL
Qty: 0 | Refills: 0 | Status: COMPLETED
Start: 2023-08-02

## 2023-10-11 RX ORDER — INFLIXIMAB-DYYB 100 MG/10ML
100 INJECTION, POWDER, LYOPHILIZED, FOR SOLUTION INTRAVENOUS
Qty: 0 | Refills: 0 | Status: ACTIVE | OUTPATIENT
Start: 2023-07-27

## 2023-10-12 LAB
ALBUMIN SERPL ELPH-MCNC: 4.5 G/DL
ALP BLD-CCNC: 69 U/L
ALT SERPL-CCNC: 24 U/L
ANION GAP SERPL CALC-SCNC: 14 MMOL/L
AST SERPL-CCNC: 24 U/L
BASOPHILS # BLD AUTO: 0.08 K/UL
BASOPHILS NFR BLD AUTO: 1.1 %
BILIRUB SERPL-MCNC: 1.2 MG/DL
BUN SERPL-MCNC: 26 MG/DL
CALCIUM SERPL-MCNC: 9.4 MG/DL
CHLORIDE SERPL-SCNC: 102 MMOL/L
CO2 SERPL-SCNC: 25 MMOL/L
CREAT SERPL-MCNC: 0.88 MG/DL
CRP SERPL-MCNC: <3 MG/L
EGFR: 94 ML/MIN/1.73M2
EOSINOPHIL # BLD AUTO: 0.29 K/UL
EOSINOPHIL NFR BLD AUTO: 3.8 %
ERYTHROCYTE [SEDIMENTATION RATE] IN BLOOD BY WESTERGREN METHOD: 2 MM/HR
GLUCOSE SERPL-MCNC: 82 MG/DL
HCT VFR BLD CALC: 47.6 %
HGB BLD-MCNC: 15.5 G/DL
IMM GRANULOCYTES NFR BLD AUTO: 0.4 %
LYMPHOCYTES # BLD AUTO: 1.55 K/UL
LYMPHOCYTES NFR BLD AUTO: 20.4 %
MAN DIFF?: NORMAL
MCHC RBC-ENTMCNC: 29.4 PG
MCHC RBC-ENTMCNC: 32.6 GM/DL
MCV RBC AUTO: 90.2 FL
MONOCYTES # BLD AUTO: 0.72 K/UL
MONOCYTES NFR BLD AUTO: 9.5 %
NEUTROPHILS # BLD AUTO: 4.93 K/UL
NEUTROPHILS NFR BLD AUTO: 64.8 %
PLATELET # BLD AUTO: 269 K/UL
POTASSIUM SERPL-SCNC: 4.6 MMOL/L
PROT SERPL-MCNC: 6.4 G/DL
RBC # BLD: 5.28 M/UL
RBC # FLD: 14.4 %
SODIUM SERPL-SCNC: 142 MMOL/L
WBC # FLD AUTO: 7.6 K/UL

## 2023-10-24 ENCOUNTER — APPOINTMENT (OUTPATIENT)
Dept: RHEUMATOLOGY | Facility: CLINIC | Age: 68
End: 2023-10-24
Payer: COMMERCIAL

## 2023-10-24 VITALS
SYSTOLIC BLOOD PRESSURE: 153 MMHG | TEMPERATURE: 98 F | DIASTOLIC BLOOD PRESSURE: 87 MMHG | OXYGEN SATURATION: 98 % | RESPIRATION RATE: 17 BRPM | HEART RATE: 66 BPM

## 2023-10-24 VITALS
TEMPERATURE: 98 F | SYSTOLIC BLOOD PRESSURE: 144 MMHG | DIASTOLIC BLOOD PRESSURE: 87 MMHG | HEART RATE: 71 BPM | OXYGEN SATURATION: 98 % | RESPIRATION RATE: 16 BRPM

## 2023-10-24 PROCEDURE — 96374 THER/PROPH/DIAG INJ IV PUSH: CPT | Mod: 59

## 2023-10-24 PROCEDURE — 96365 THER/PROPH/DIAG IV INF INIT: CPT

## 2023-10-24 PROCEDURE — 96366 THER/PROPH/DIAG IV INF ADDON: CPT

## 2023-10-24 RX ORDER — CETIRIZINE HYDROCHLORIDE 10 MG/1
10 TABLET, FILM COATED ORAL
Qty: 0 | Refills: 0 | Status: COMPLETED
Start: 2023-08-02

## 2023-10-24 RX ORDER — INFLIXIMAB-DYYB 100 MG/10ML
100 INJECTION, POWDER, LYOPHILIZED, FOR SOLUTION INTRAVENOUS
Qty: 0 | Refills: 0 | Status: COMPLETED
Start: 2023-07-27

## 2023-10-24 RX ORDER — METHYLPREDNISOLONE 40 MG/ML
40 INJECTION, POWDER, LYOPHILIZED, FOR SOLUTION INTRAMUSCULAR; INTRAVENOUS
Qty: 1 | Refills: 0 | Status: COMPLETED
Start: 2023-08-02

## 2023-10-24 RX ORDER — ACETAMINOPHEN 325 MG/1
325 TABLET ORAL
Qty: 0 | Refills: 0 | Status: COMPLETED
Start: 2023-08-02

## 2023-10-26 ENCOUNTER — APPOINTMENT (OUTPATIENT)
Dept: RHEUMATOLOGY | Facility: CLINIC | Age: 68
End: 2023-10-26

## 2023-11-29 RX ORDER — METHOTREXATE 2.5 MG/1
2.5 TABLET ORAL
Qty: 40 | Refills: 0 | Status: ACTIVE | COMMUNITY
Start: 2022-08-29 | End: 1900-01-01

## 2023-11-29 RX ORDER — FOLIC ACID 1 MG/1
1 TABLET ORAL DAILY
Qty: 90 | Refills: 0 | Status: ACTIVE | COMMUNITY
Start: 2022-08-29 | End: 1900-01-01

## 2023-12-19 NOTE — ASSESSMENT
[FreeTextEntry1] : 67-year-old male, here for follow up w/ OA hands; reports he had pelvic/ hip pain that then progressed to bilateral shoulder pain/stiffness that made him cry, w/ high ESR=45 that responded promptly to medrol dose pack concerning for Polymyalgia Rheumatica w/ high CRP=20. \par States he had 10 lb unintentional wt loss & saw Heme/onc w/ workup ok there. \par \par PMR:\par -today on prednisone 9 mg daily x 2 weeks he has Good ROM in b/l shoulders, no pelvic/girdle stiffness and able to stand up without using her hands, no temporal pain/unremitting headaches, no vision changes, no jaw pain.\par -reviewed labs 10/27/22 w/ now normal CRP; normal ESR now; CBC/CMP ok; +JA  1:640 homogenous; DS-DNA neg; C3/C4 WNL\par -discussed r/b/s of prednisone 9 mg daily x 2 weeks; then prednisone 8 mg daily x 4 weeks until follow up w/ pt agreeable and prescription sent as below\par -discussed to take Ca+vitD BID while on steroids\par -discussed r/b/s of MTX as steroid sparing agent & pt agreeable and knows to avoid alcohol\par -refill sent for MTX 2.5mg 4tabs PO weekly as discussed\par -refill sent for folic acid 1mg daily as discussed \par -labs as below prior to f/u discussed \par -reviewed labs 8/3/22 w/ high CRP=20; normal ESR then; CBC/CMP ok; hepatitis negative; +JA 1:320 speckled; DS-DNA neg; C3/C4 WNL; urine prot/creat ratio WNL \par -labs 7/22/22 w/ high ESR=45; CPK normal=60; normal TSH; normal T4; +JA 1:320 homogenous; RF negative; high WBC=11.77 & high [id number] (can be reactive to f/u w/ heme/onc also); CMP ok; UA micro w/ calcium oxylate crystals (handout given on foods to avoid)\par -denies any GCA symptoms and knows to alert us if any concerns discussed \par -No synovitis or effusion on exam noted today and advised to monitor.\par \par OA shoulders: no pain today \par xray b/l shoulders 8/5/22 w/ b/l OA shoulders; Rt shoulder calcific tendonitis \par -Has full ROM in b/l shoulders, no pelvic/girdle stiffness and able to stand up without using her hands, no temporal pain/unremitting headaches, no vision changes, no jaw pain.\par \par Hip OA: no pain today \par -xray b/l hips 8/5/22 Rt>Lt hip OA and denies any groin/hip pain at this time\par \par +JA 1:320 homogenous: repeat 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\par -Clinically without much symptoms of lupus at this time and educated on symptoms to monitor for in detail if he evolves.\par -reviewed labs w/ 10/27/22 w/ disease activity markers normal w/ +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; DS-DNA neg; C3/C4 WNL; urine prot/creat ratio requested today to monitor \par -thyroid abs Negative; checked as +JA can be seen with cross reactivity\par \par Knee OA: no pain today \par -xray b/l kees 8/5/22 w/ b/l OA\par -voltaren gel 1% PRN \par -consider steroid injections if pain \par \par -educated on symptoms to monitor for in detail and alert us if any concerns.\par -knows to stay up to date on health maintenance w/ PCP\par -f/u in 4-6 weeks w/ labs please\par  Detail Level: Detailed Depth Of Biopsy: dermis Was A Bandage Applied: Yes Size Of Lesion In Cm: 1 X Size Of Lesion In Cm: 0 Biopsy Type: H and E Biopsy Method: Personna blade Anesthesia Type: 1% lidocaine with epinephrine Anesthesia Volume In Cc: 0.5 Hemostasis: Aluminum Chloride Wound Care: Petrolatum Dressing: bandage Destruction After The Procedure: No Type Of Destruction Used: Curettage Curettage Text: The wound bed was treated with curettage after the biopsy was performed. Cryotherapy Text: The wound bed was treated with cryotherapy after the biopsy was performed. Electrodesiccation Text: The wound bed was treated with electrodesiccation after the biopsy was performed. Electrodesiccation And Curettage Text: The wound bed was treated with electrodesiccation and curettage after the biopsy was performed. Silver Nitrate Text: The wound bed was treated with silver nitrate after the biopsy was performed. Lab: -[id number] Lab Facility: [id number] Consent: Written consent was obtained with patient's permission and risks were reviewed per signed consent form. Biopsy was performed with patient's verbal permission. Post-care instructions were given and reviewed in detail. Patient is to keep the biopsy site dry overnight, and then apply vaseline daily until healed. The patient was instructed to call with any questions or concerns. Notification Instructions: Patient will be notified of biopsy results. However, patient instructed to call the office if not contacted within 2 weeks. Billing Type: Third-Party Bill Information: Selecting Yes will display possible errors in your note based on the variables you have selected. This validation is only offered as a suggestion for you. PLEASE NOTE THAT THE VALIDATION TEXT WILL BE REMOVED WHEN YOU FINALIZE YOUR NOTE. IF YOU WANT TO FAX A PRELIMINARY NOTE YOU WILL NEED TO TOGGLE THIS TO 'NO' IF YOU DO NOT WANT IT IN YOUR FAXED NOTE.